# Patient Record
Sex: MALE | Race: WHITE | NOT HISPANIC OR LATINO | Employment: PART TIME | URBAN - NONMETROPOLITAN AREA
[De-identification: names, ages, dates, MRNs, and addresses within clinical notes are randomized per-mention and may not be internally consistent; named-entity substitution may affect disease eponyms.]

---

## 2017-02-06 ENCOUNTER — HISTORY (OUTPATIENT)
Dept: FAMILY MEDICINE | Facility: OTHER | Age: 42
End: 2017-02-06

## 2017-02-06 ENCOUNTER — OFFICE VISIT - GICH (OUTPATIENT)
Dept: FAMILY MEDICINE | Facility: OTHER | Age: 42
End: 2017-02-06

## 2017-02-06 DIAGNOSIS — R39.15 URGENCY OF URINATION: ICD-10-CM

## 2017-02-06 LAB
BACTERIA URINE: ABNORMAL BACTERIA/HPF
BILIRUB UR QL: NEGATIVE
CLARITY, URINE: CLEAR CLARITY
COLOR UR: YELLOW COLOR
EPITHELIAL CELLS: ABNORMAL EPI/HPF
GLUCOSE URINE: NEGATIVE MG/DL
KETONES UR QL: NEGATIVE MG/DL
LEUKOCYTE ESTERASE URINE: ABNORMAL
NITRITE UR QL STRIP: NEGATIVE
OCCULT BLOOD,URINE - HISTORICAL: NEGATIVE
PH UR: 6 [PH]
PROTEIN QUALITATIVE,URINE - HISTORICAL: NEGATIVE MG/DL
RBC - HISTORICAL: ABNORMAL /HPF
SP GR UR STRIP: 1.02
UROBILINOGEN,QUALITATIVE - HISTORICAL: NORMAL EU/DL
WBC - HISTORICAL: ABNORMAL /HPF

## 2017-02-08 ENCOUNTER — COMMUNICATION - GICH (OUTPATIENT)
Dept: FAMILY MEDICINE | Facility: OTHER | Age: 42
End: 2017-02-08

## 2017-02-08 DIAGNOSIS — F42.9 OBSESSIVE-COMPULSIVE DISORDER: ICD-10-CM

## 2017-02-24 ENCOUNTER — COMMUNICATION - GICH (OUTPATIENT)
Dept: FAMILY MEDICINE | Facility: OTHER | Age: 42
End: 2017-02-24

## 2017-02-24 DIAGNOSIS — N41.0 ACUTE PROSTATITIS: ICD-10-CM

## 2017-12-11 ENCOUNTER — OFFICE VISIT - GICH (OUTPATIENT)
Dept: FAMILY MEDICINE | Facility: OTHER | Age: 42
End: 2017-12-11

## 2017-12-11 ENCOUNTER — HISTORY (OUTPATIENT)
Dept: FAMILY MEDICINE | Facility: OTHER | Age: 42
End: 2017-12-11

## 2017-12-11 DIAGNOSIS — R30.0 DYSURIA: ICD-10-CM

## 2017-12-11 DIAGNOSIS — F42.9 OBSESSIVE-COMPULSIVE DISORDER: ICD-10-CM

## 2017-12-11 LAB
BILIRUB UR QL: NEGATIVE
CLARITY, URINE: CLEAR CLARITY
COLOR UR: YELLOW COLOR
GLUCOSE URINE: NEGATIVE MG/DL
KETONES UR QL: NEGATIVE MG/DL
LEUKOCYTE ESTERASE URINE: NEGATIVE
NITRITE UR QL STRIP: NEGATIVE
OCCULT BLOOD,URINE - HISTORICAL: NEGATIVE
PH UR: 5 [PH]
PROTEIN QUALITATIVE,URINE - HISTORICAL: NEGATIVE MG/DL
SP GR UR STRIP: 1.02
UROBILINOGEN,QUALITATIVE - HISTORICAL: NORMAL EU/DL

## 2018-01-02 ENCOUNTER — HISTORY (OUTPATIENT)
Dept: PEDIATRICS | Facility: OTHER | Age: 43
End: 2018-01-02

## 2018-01-02 ENCOUNTER — OFFICE VISIT - GICH (OUTPATIENT)
Dept: PEDIATRICS | Facility: OTHER | Age: 43
End: 2018-01-02

## 2018-01-02 DIAGNOSIS — M76.62 ACHILLES TENDINITIS OF LEFT LOWER EXTREMITY: ICD-10-CM

## 2018-01-02 DIAGNOSIS — M76.61 ACHILLES TENDINITIS OF RIGHT LOWER EXTREMITY: ICD-10-CM

## 2018-01-03 ENCOUNTER — HOSPITAL ENCOUNTER (OUTPATIENT)
Dept: PHYSICAL THERAPY | Facility: OTHER | Age: 43
Setting detail: THERAPIES SERIES
End: 2018-01-03
Attending: INTERNAL MEDICINE

## 2018-01-03 DIAGNOSIS — M76.61 ACHILLES TENDINITIS OF RIGHT LOWER EXTREMITY: ICD-10-CM

## 2018-01-03 DIAGNOSIS — M76.62 ACHILLES TENDINITIS OF LEFT LOWER EXTREMITY: ICD-10-CM

## 2018-01-03 NOTE — TELEPHONE ENCOUNTER
Patient Information     Patient Name MRN Bebeto Vasquez [id number] Male 1975      Telephone Encounter by Shira Rodriguez at 2017  1:45 PM     Author:  Shira Rodriguez Service:  (none) Author Type:  (none)     Filed:  2017  1:47 PM Encounter Date:  2017 Status:  Signed     :  Shira Rodriguez            Patient states he is still having burning with urination. He states he was told by Dc Youssef MD if nothing was to get better that he would prescribe something. Explained that Dc Youssef MD is out until Wednesday, he would like a message sent to another provider to see if something can be done?    Shira Rodriguez LPN.......................... 2017  1:46 PM

## 2018-01-03 NOTE — PROGRESS NOTES
Patient Information     Patient Name MRN Sex Bebeto Rivera [id number] Male 1975      Progress Notes by Dc Youssef MD at 2017  1:45 PM     Author:  Dc Youssef MD Service:  (none) Author Type:  Physician     Filed:  2017  3:05 PM Encounter Date:  2017 Status:  Signed     :  Dc Youssef MD (Physician)            SUBJECTIVE:  Bebeto Prescott is a 41 y.o. male who presents concerned about possible prostate issues. About 7-10 days ago started to note a warm, tingly feeling between his scrotum and rectum. Also during this time had slight urinary urgency and frequency without dysuria. Had intercourse once with his wife during this time with slight burning with ejaculation. Family history is positive for kidney cancer. He denies fever or urethral discharge.    Allergies      Allergen   Reactions     Other [Unlisted Allergen (Include Detail In Comments)]  Other - Describe In Comment Field     Moldy leaves: sneezing, runny nose, cough     and   Current Outpatient Prescriptions on File Prior to Visit       Medication  Sig Dispense Refill     PARoxetine (PAXIL) 10 mg tablet Take 1 tablet by mouth every morning. 90 tablet 4     No current facility-administered medications on file prior to visit.        OBJECTIVE:  /88  Pulse 60  Wt 90.4 kg (199 lb 4 oz)  BMI 31.21 kg/m2  EXAM:  General Appearance: Pleasant, alert, appropriate appearance for age. No acute distress  Rectal Exam: Prostate is mildly enlarged and boggy without significant tenderness.  Genitourinary Exam Male: Testicular exam reveals no tenderness or masses. No evidence of inguinal hernias.    Results for orders placed or performed in visit on 17      URINALYSIS W REFLEX MICROSCOPIC IF POSITIVE      Result  Value Ref Range    COLOR                     Yellow Yellow Color    CLARITY                   Clear Clear Clarity    SPECIFIC GRAVITY,URINE    1.025 1.010, 1.015, 1.020, 1.025                     PH,URINE                  6.0 6.0, 7.0, 8.0, 5.5, 6.5, 7.5, 8.5                    UROBILINOGEN,QUALITATIVE  Normal Normal EU/dl    PROTEIN, URINE Negative Negative mg/dL    GLUCOSE, URINE Negative Negative mg/dL    KETONES,URINE             Negative Negative mg/dL    BILIRUBIN,URINE           Negative Negative                    OCCULT BLOOD,URINE        Negative Negative                    NITRITE                   Negative Negative                    LEUKOCYTE ESTERASE        Trace (A) Negative                   URINALYSIS MICROSCOPIC      Result  Value Ref Range    RBC None Seen 0-2, None Seen /HPF    WBC 6-10 (A) 0-2, 3-5, None Seen /HPF    BACTERIA                  None Seen None Seen, Rare, Occasional, Few Bacteria/HPF    EPITHELIAL CELLS          Few None Seen, Few Epi/HPF       ASSESSMENT/Plan :      ICD-10-CM    1. Urinary urgency  Elected to proceed with a urine culture. Symptoms consistent with a mild urethritis versus mild prostatitis. His symptoms have been improving. Recommended increased fluids and observation. Would consider treating if not improving over the next week.  R39.15 URINALYSIS W REFLEX MICROSCOPIC IF POSITIVE       Dc Youssef MD

## 2018-01-03 NOTE — TELEPHONE ENCOUNTER
Patient Information     Patient Name MRN Sex Bebeto Rivera [id number] Male 1975      Telephone Encounter by Bryan Bernal MD at 2017  2:04 PM     Author:  Bryan Bernal MD  Service:  (none) Author Type:  Physician     Filed:  2017  2:11 PM  Encounter Date:  2017 Status:  Addendum     :  Bryan Bernal MD (Physician)        Related Notes: Original Note by Bryan Bernal MD (Physician) filed at 2017  2:06 PM            Urine culture was neg.  I do not know what Dr Youssef was thinking   Possible a antibiotics  Will prescribe 1 week of Bactrim but patient need to see PCP next week

## 2018-01-03 NOTE — TELEPHONE ENCOUNTER
Patient Information     Patient Name MRN Bebeto Vasquez [id number] Male 1975      Telephone Encounter by Shira Rodriguez at 2017  2:28 PM     Author:  Shira Rodriguez Service:  (none) Author Type:  (none)     Filed:  2017  2:28 PM Encounter Date:  2017 Status:  Signed     :  Shira Rodriguez            Patient notified.  Shira Rodriguez LPN.......................... 2017  2:28 PM

## 2018-01-03 NOTE — TELEPHONE ENCOUNTER
Patient Information     Patient Name MRN Bebeto Vasquez [id number] Male 1975      Telephone Encounter by Alyce Ying RN at 2017  9:48 AM     Author:  Alyce Ying RN Service:  (none) Author Type:  (none)     Filed:  2017  9:50 AM Encounter Date:  2017 Status:  Signed     :  Alyce Ying RN (NURS- Registered Nurse)            Depression-in adults 18 and over  SSRI    Office visit in the past 12 months or as indicated in chart.  Should have clinic visit 1-2 months after initial prescription.    Last visit with VALERI DIETRICH was on: 2017 in [facility]  Next visit with VALERI DIETRICH is on: No future appointment listed with this provider  Next visit with Family Practice is on: No future appointment listed in this department    Max refills 12 months from last office visit or per providers notes.    PHQ Depression Screening 2016   Date of PHQ exam (doc flow) 2016   1. Lack of interest/pleasure 0 - Not at all 0 - Not at all   2. Feeling down/depressed 0 - Not at all 0 - Not at all   PHQ-2 TOTAL SCORE 0 0     Prescription refilled per RN Medication Refill Policy.................... Alyce Ying ....................  2017   9:48 AM

## 2018-01-03 NOTE — NURSING NOTE
Patient Information     Patient Name MRN Bebeto Vasquez [id number] Male 1975      Nursing Note by Brook Calderon at 2017  1:45 PM     Author:  Brook Calderon Service:  (none) Author Type:  (none)     Filed:  2017  1:57 PM Encounter Date:  2017 Status:  Signed     :  Brook Calderon            Patient presents today with some prostate concerns.  He states that he had some symptoms that he would rather discuss with the provider.  Brook Calderon LPN  2017  1:51 PM

## 2018-01-05 ENCOUNTER — HOSPITAL ENCOUNTER (OUTPATIENT)
Dept: PHYSICAL THERAPY | Facility: OTHER | Age: 43
Setting detail: THERAPIES SERIES
End: 2018-01-05
Attending: INTERNAL MEDICINE

## 2018-01-08 ENCOUNTER — HOSPITAL ENCOUNTER (OUTPATIENT)
Dept: PHYSICAL THERAPY | Facility: OTHER | Age: 43
Setting detail: THERAPIES SERIES
End: 2018-01-08
Attending: INTERNAL MEDICINE

## 2018-01-12 ENCOUNTER — HOSPITAL ENCOUNTER (OUTPATIENT)
Dept: PHYSICAL THERAPY | Facility: OTHER | Age: 43
Setting detail: THERAPIES SERIES
End: 2018-01-12
Attending: INTERNAL MEDICINE

## 2018-01-17 ENCOUNTER — HOSPITAL ENCOUNTER (OUTPATIENT)
Dept: PHYSICAL THERAPY | Facility: OTHER | Age: 43
Setting detail: THERAPIES SERIES
End: 2018-01-17
Attending: INTERNAL MEDICINE

## 2018-01-24 ENCOUNTER — DOCUMENTATION ONLY (OUTPATIENT)
Dept: FAMILY MEDICINE | Facility: OTHER | Age: 43
End: 2018-01-24

## 2018-01-24 ENCOUNTER — HOSPITAL ENCOUNTER (OUTPATIENT)
Dept: PHYSICAL THERAPY | Facility: OTHER | Age: 43
Setting detail: THERAPIES SERIES
End: 2018-01-24
Attending: INTERNAL MEDICINE

## 2018-01-24 PROBLEM — M76.62 ACHILLES TENDONITIS, BILATERAL: Status: ACTIVE | Noted: 2018-01-02

## 2018-01-24 PROBLEM — T78.40XA ALLERGIC STATE: Status: ACTIVE | Noted: 2018-01-24

## 2018-01-24 PROBLEM — M76.61 ACHILLES TENDONITIS, BILATERAL: Status: ACTIVE | Noted: 2018-01-02

## 2018-01-24 PROBLEM — N41.0 ACUTE PROSTATITIS: Status: ACTIVE | Noted: 2017-02-24

## 2018-01-24 RX ORDER — PAROXETINE 10 MG/1
10 TABLET, FILM COATED ORAL EVERY MORNING
COMMUNITY
Start: 2017-12-11 | End: 2019-01-04

## 2018-01-26 VITALS
SYSTOLIC BLOOD PRESSURE: 124 MMHG | DIASTOLIC BLOOD PRESSURE: 88 MMHG | WEIGHT: 199.25 LBS | HEART RATE: 60 BPM | BODY MASS INDEX: 31.21 KG/M2

## 2018-02-07 ENCOUNTER — HOSPITAL ENCOUNTER (OUTPATIENT)
Dept: PHYSICAL THERAPY | Facility: OTHER | Age: 43
Setting detail: THERAPIES SERIES
End: 2018-02-07
Attending: INTERNAL MEDICINE

## 2018-02-09 VITALS
DIASTOLIC BLOOD PRESSURE: 68 MMHG | WEIGHT: 196 LBS | BODY MASS INDEX: 29.7 KG/M2 | SYSTOLIC BLOOD PRESSURE: 110 MMHG | HEIGHT: 68 IN

## 2018-02-09 VITALS
HEIGHT: 68 IN | HEART RATE: 64 BPM | DIASTOLIC BLOOD PRESSURE: 72 MMHG | SYSTOLIC BLOOD PRESSURE: 118 MMHG | BODY MASS INDEX: 30.77 KG/M2 | WEIGHT: 203 LBS

## 2018-02-12 NOTE — PROGRESS NOTES
"Patient Information     Patient Name MRN Sex Bebeto Rivera [id number] Male 1975      Progress Notes by Dc Youssef MD at 2017  9:45 AM     Author:  Dc Youssef MD Service:  (none) Author Type:  Physician     Filed:  2017 10:26 AM Encounter Date:  2017 Status:  Signed     :  Dc Youssef MD (Physician)            SUBJECTIVE:  Bebeto Prescott is a 42 y.o. male who presents for evaluation of discomfort at the base of his scrotum that worsens slightly with urination. Has noted a slight increase in urinary frequency over time but nothing dramatic recently. Denies blood in the urine. He had similar symptoms before and improved with a course of Bactrim DS. He is  and in a monogamous relationship. Denies urethral discharge. He has been doing a lot of exercising on a stationary bicycle recently and is wondering if this could be contributing to his symptoms. He has stopped bicycling for now and has noted almost complete resolution of symptoms.    Allergies      Allergen   Reactions     Other [Unlisted Allergen (Include Detail In Comments)]  Other - Describe In Comment Field     Moldy leaves: sneezing, runny nose, cough     and   No current outpatient prescriptions on file prior to visit.     No current facility-administered medications on file prior to visit.        OBJECTIVE:  /68  Ht 1.721 m (5' 7.75\")  Wt 88.9 kg (196 lb)  BMI 30.02 kg/m2  EXAM:  General Appearance: Pleasant, alert, appropriate appearance for age. No acute distress  Genitourinary Exam Male: Testicles without masses or tenderness. No evidence of inguinal hernias.    ASSESSMENT/Plan :      ICD-10-CM    1. Dysuria  Likely related to local trauma from frequent bicycling. Will await urinalysis results. Would recommend he adjust the height of the bicycle seat in the future to see if this helps. Consider treating again with Bactrim DS for possible mild prostatitis if symptoms return.  " R30.0 URINALYSIS W REFLEX MICROSCOPIC IF POSITIVE      URINALYSIS W REFLEX MICROSCOPIC IF POSITIVE   2. Obsessive-compulsive disorder, unspecified type F42.9 PARoxetine (PAXIL) 10 mg tablet       Dc Youssef MD

## 2018-02-12 NOTE — PROGRESS NOTES
Patient Information     Patient Name MRN Sex Bebeto Rivera [id number] Male 1975      Progress Notes by Bebeto Feldman MD at 2018  4:15 PM     Author:  Bebeto Feldman MD Service:  (none) Author Type:  Physician     Filed:  2018  5:00 PM Encounter Date:  2018 Status:  Signed     :  Bebeto Feldman MD (Physician)            Subjective  Bebeto Prescott is a 42 y.o. male who presents for Achilles tendon pain. He first injured his left Achilles tendon 8 years ago. It was while he was training for half marathon. He was diagnosed with micro-tears of the Achilles tendon. He took 2 years off from training. The thing that helped in the most was microcurrent therapy. He was improved and even transfer and a half marathon without symptoms. Last winter he had a recurrence of bilateral Achilles heel pain. The right is now fine but the left seems to continue to be worse. Yesterday he went to the [employer] and hurt his foot a lot. He's limping around.    Problem List/PMH: reviewed in EMR, and made relevant updates today.  Medications: reviewed in EMR, and made relevant updates today.  Allergies: reviewed in EMR, and made relevant updates today.    Social Hx:  Social History     Substance Use Topics       Smoking status: Never Smoker     Smokeless tobacco: Never Used     Alcohol use No     Social History Narrative    Single.  Recently moved to [place] to  a new [place].      I reviewed social history and made relevant updates today.    Family Hx:   Family History       Problem   Relation Age of Onset     Cancer  Father      Kidney cancer       Hyperlipidemia  Father      Hyperlipidemia       GI Disease  Mother      Diverticulosis.       Psychiatric illness  Brother      Depression, learning disability       Cancer-breast  Sister      Possible breast cancer         Objective  Vitals: reviewed in EMR.  /72 (Cuff Site: Right Arm, Position: Sitting, Cuff Size: Adult Large)   "Pulse 64  Ht 1.721 m (5' 7.75\")  Wt 92.1 kg (203 lb)  BMI 31.09 kg/m2    Gen: Pleasant male, NAD.  HEENT: MMM  Neck: Supple  Pulm: Breathing easily  Neuro: Grossly intact  Skin: No concerning lesions.  Psychiatric: Normal affect and insight. Does not appear anxious or depressed.  Musculoskeletal: Tenderness to palpation of the Achilles tendon bilaterally left greater than right.      Assessment    ICD-10-CM    1. Achilles tendonitis, bilateral M76.61 AMB CONSULT TO PHYSICAL THERAPY     M76.62      Plan   -- Expected clinical course discussed   -- Medications and their side effects discussed  Patient Instructions    -- Tylenol 1000 mg (2 extra strength tablets) 3 times per day scheduled   -- No other sources of Tylenol/acetaminophen/APAP, as this can affect your liver   -- Ibuprofen 600 mg (3 tablets) 3 times per day for 7 days, then as needed   -- Take ibuprofen with food, as can be hard on the stomach   -- Rest   -- Ice/heat whichever feels better   -- Topicals: capsaicin cream, lidocaine, Aspercreme/IcyHot   -- Schedule visit for physical therapy   -- Call or come back if concerns, else as needed     -- Will consult Dr. Guardado if you are not improving with PT and rest           Index Korean All languages Exercises Related topics   Achilles Tendon Injury   ________________________________________________________________________  KEY POINTS    An Achilles tendon injury is stretching or tearing of the strong band of tissue that attaches your heel bone to the calf muscle.    Change or stop doing the activities that cause pain until the injury heals.    An Achilles tendon injury can be treated with special shoes or shoe inserts, exercise, ice, and sometimes with medicine or surgery.  ________________________________________________________________________  What is an Achilles tendon injury?   An Achilles tendon injury is a problem with the tendon that connects your heel bone to the calf muscle of your lower leg. " Tendons are strong bands of tissue that attach muscle to bone. You use the Achilles tendon when you point your foot down and when you walk, run, or jump.  Tendons can be injured suddenly or they may be slowly damaged over time. You can have tiny or partial tears in your tendon. If you have a complete tear of your tendon, it s called a rupture. Other tendon injuries may be called a strain, tendinosis, or tendonitis.   What is the cause?   Achilles tendon injuries can be caused by:    Overuse of the tendon, such as from lots of uphill running, intense exercise, or sports training or from doing a lot of work that causes you to bend at the knees and ankles    A sudden activity that twists or tears your tendon, such as jumping, starting to sprint, or falling  You are more likely to have an Achilles tendon problem if you:    Have tight calf muscles or a tight Achilles tendon    Change the type of running shoes you wear, or if you wear high heels most of the day and then switch to lower heeled shoes for exercise    Have a problem called over-pronation, which happens when your feet roll inward and flatten out more than normal when you walk or run  What are the symptoms?   Symptoms may include:    Pain, stiffness, weakness, or swelling in the back of your lower leg    Pain in the back of your leg or ankle when you rise up on your toes    Trouble pointing your foot downward  If the tendon is completely torn, you may have felt a pop at the time of the injury. You may not be able to lift your heel off the ground or point your toes.  How is it diagnosed?   Your healthcare provider will ask about your symptoms, activities, and medical history and examine you. Your provider may ask to watch you walk or run to see if your feet flatten more than normal. Tests may include:    X-rays    MRI, which uses a strong magnetic field and radio waves to show detailed pictures of your foot and leg    An ultrasound, which uses sound waves to  show pictures of the lower leg  How is it treated?   You will need to change or stop doing the activities that cause pain until your tendon has healed. For example, you may need to swim instead of run.   Your healthcare provider may recommend stretching and strengthening exercises to help you heal.   Special shoes or shoe inserts may help. If you have a severe injury, your healthcare provider may put your foot in a cast or boot for several weeks to keep it from moving while it heals.   If your tendon is torn, you may need surgery to repair the tendon.  The pain often gets better within a few weeks with self-care, but some injuries may take several months or longer to heal. It s important to follow all of your healthcare provider s instructions.  How can I take care of myself?  To keep swelling down and help relieve pain:    Put an ice pack, gel pack, or package of frozen vegetables wrapped in a cloth on the injured area every 3 to 4 hours for up to 20 minutes at a time.    Do ice massage. To do this, freeze water in a Styrofoam cup, then peel the top of the cup away to expose the ice. Hold the bottom of the cup and rub the ice over the painful area for 5 to 10 minutes. Do this several times a day while you have pain.    Keep your foot up on pillows so that it is above the level of your heart when you sit or lie down.    Take nonprescription pain medicine, such as acetaminophen, ibuprofen, or naproxen. Read the label and take as directed. Unless recommended by your healthcare provider, you should not take these medicines for more than 10 days.    Nonsteroidal anti-inflammatory medicines (NSAIDs), such as ibuprofen, naproxen, and aspirin, may cause stomach bleeding and other problems. These risks increase with age.    Acetaminophen may cause liver damage or other problems. Unless recommended by your provider, don't take more than 3000 milligrams (mg) in 24 hours. To make sure you don t take too much, check other  medicines you take to see if they also contain acetaminophen. Ask your provider if you need to avoid drinking alcohol while taking this medicine.    Put moist heat on the sore area for 10 to 15 minutes before you do warm-up and stretching exercises. Moist heat may help relax your muscles. Moist heat includes heat patches or moist heating pads that you can buy at most drugstores, a warm wet washcloth, or a hot shower. To prevent burns to your skin, follow directions on the package and do not lie on any type of hot pad. Don t use heat if you have swelling.  Follow your healthcare provider's instructions, including any exercises recommended by your provider. Ask your provider:    How and when you will get your test results    How long it will take to recover    If there are activities you should avoid and when you can return to your normal activities    How to take care of yourself at home    What symptoms or problems you should watch for and what to do if you have them  Make sure you know when you should come back for a checkup. Keep all appointments for provider visits or tests.  How can I help prevent an Achilles tendon problem?   Warm-up exercises and stretching before activities can help prevent injuries. If you have tight Achilles tendons or calf muscles, stretch them twice a day whether or not you are doing any activities that day. If your leg or ankle hurts after exercise, putting ice on it may help keep it from getting injured.   Avoid running uphill if you tend to have Achilles tendon injuries.   Follow the safety rules and use the protective equipment recommended for your work or sport.  Developed by [employer].  Adult Advisor 2017.2 published by [employer].  Last modified: 2016-10-24  Last reviewed: 2016-10-24  This content is reviewed periodically and is subject to change as new health information becomes available. The information is intended to inform and educate and is not a replacement for medical  evaluation, advice, diagnosis or treatment by a healthcare professional.  References   Adult Advisor 2017.2 Index    Copyright   2017 [employer], a division of [employer] All rights reserved.           No Follow-up on file.    Signed, Bebeto Feldman MD  Internal Medicine & Pediatrics

## 2018-02-12 NOTE — NURSING NOTE
Patient Information     Patient Name MRN Sex Bebeto Rivera [id number] Male 1975      Nursing Note by Michaela Hernández at 2018  4:15 PM     Author:  Michaela Hernádnez Service:  (none) Author Type:  (none)     Filed:  2018  4:33 PM Encounter Date:  2018 Status:  Signed     :  Michaela Hernández            Patient presents to clinic for left foot achilles tendon pain.  Michaela Hernández LPN ....................  2018   4:13 PM

## 2018-02-12 NOTE — PATIENT INSTRUCTIONS
Patient Information     Patient Name MRBebeto Verdin [id number] Male 1975      Patient Instructions by Bebeto Feldman MD at 2018  4:15 PM     Author:  Bebeto Feldman MD Service:  (none) Author Type:  Physician     Filed:  2018  4:47 PM Encounter Date:  2018 Status:  Signed     :  Bebeto Feldman MD (Physician)             -- Tylenol 1000 mg (2 extra strength tablets) 3 times per day scheduled   -- No other sources of Tylenol/acetaminophen/APAP, as this can affect your liver   -- Ibuprofen 600 mg (3 tablets) 3 times per day for 7 days, then as needed   -- Take ibuprofen with food, as can be hard on the stomach   -- Rest   -- Ice/heat whichever feels better   -- Topicals: capsaicin cream, lidocaine, Aspercreme/IcyHot   -- Schedule visit for physical therapy   -- Call or come back if concerns, else as needed     -- Will consult Dr. Guardado if you are not improving with PT and rest           Index Lao All languages Exercises Related topics   Achilles Tendon Injury   ________________________________________________________________________  KEY POINTS    An Achilles tendon injury is stretching or tearing of the strong band of tissue that attaches your heel bone to the calf muscle.    Change or stop doing the activities that cause pain until the injury heals.    An Achilles tendon injury can be treated with special shoes or shoe inserts, exercise, ice, and sometimes with medicine or surgery.  ________________________________________________________________________  What is an Achilles tendon injury?   An Achilles tendon injury is a problem with the tendon that connects your heel bone to the calf muscle of your lower leg. Tendons are strong bands of tissue that attach muscle to bone. You use the Achilles tendon when you point your foot down and when you walk, run, or jump.  Tendons can be injured suddenly or they may be slowly damaged over time. You can have tiny or partial  tears in your tendon. If you have a complete tear of your tendon, it s called a rupture. Other tendon injuries may be called a strain, tendinosis, or tendonitis.   What is the cause?   Achilles tendon injuries can be caused by:    Overuse of the tendon, such as from lots of uphill running, intense exercise, or sports training or from doing a lot of work that causes you to bend at the knees and ankles    A sudden activity that twists or tears your tendon, such as jumping, starting to sprint, or falling  You are more likely to have an Achilles tendon problem if you:    Have tight calf muscles or a tight Achilles tendon    Change the type of running shoes you wear, or if you wear high heels most of the day and then switch to lower heeled shoes for exercise    Have a problem called over-pronation, which happens when your feet roll inward and flatten out more than normal when you walk or run  What are the symptoms?   Symptoms may include:    Pain, stiffness, weakness, or swelling in the back of your lower leg    Pain in the back of your leg or ankle when you rise up on your toes    Trouble pointing your foot downward  If the tendon is completely torn, you may have felt a pop at the time of the injury. You may not be able to lift your heel off the ground or point your toes.  How is it diagnosed?   Your healthcare provider will ask about your symptoms, activities, and medical history and examine you. Your provider may ask to watch you walk or run to see if your feet flatten more than normal. Tests may include:    X-rays    MRI, which uses a strong magnetic field and radio waves to show detailed pictures of your foot and leg    An ultrasound, which uses sound waves to show pictures of the lower leg  How is it treated?   You will need to change or stop doing the activities that cause pain until your tendon has healed. For example, you may need to swim instead of run.   Your healthcare provider may recommend stretching and  strengthening exercises to help you heal.   Special shoes or shoe inserts may help. If you have a severe injury, your healthcare provider may put your foot in a cast or boot for several weeks to keep it from moving while it heals.   If your tendon is torn, you may need surgery to repair the tendon.  The pain often gets better within a few weeks with self-care, but some injuries may take several months or longer to heal. It s important to follow all of your healthcare provider s instructions.  How can I take care of myself?  To keep swelling down and help relieve pain:    Put an ice pack, gel pack, or package of frozen vegetables wrapped in a cloth on the injured area every 3 to 4 hours for up to 20 minutes at a time.    Do ice massage. To do this, freeze water in a Styrofoam cup, then peel the top of the cup away to expose the ice. Hold the bottom of the cup and rub the ice over the painful area for 5 to 10 minutes. Do this several times a day while you have pain.    Keep your foot up on pillows so that it is above the level of your heart when you sit or lie down.    Take nonprescription pain medicine, such as acetaminophen, ibuprofen, or naproxen. Read the label and take as directed. Unless recommended by your healthcare provider, you should not take these medicines for more than 10 days.    Nonsteroidal anti-inflammatory medicines (NSAIDs), such as ibuprofen, naproxen, and aspirin, may cause stomach bleeding and other problems. These risks increase with age.    Acetaminophen may cause liver damage or other problems. Unless recommended by your provider, don't take more than 3000 milligrams (mg) in 24 hours. To make sure you don t take too much, check other medicines you take to see if they also contain acetaminophen. Ask your provider if you need to avoid drinking alcohol while taking this medicine.    Put moist heat on the sore area for 10 to 15 minutes before you do warm-up and stretching exercises. Moist heat  may help relax your muscles. Moist heat includes heat patches or moist heating pads that you can buy at most drugstores, a warm wet washcloth, or a hot shower. To prevent burns to your skin, follow directions on the package and do not lie on any type of hot pad. Don t use heat if you have swelling.  Follow your healthcare provider's instructions, including any exercises recommended by your provider. Ask your provider:    How and when you will get your test results    How long it will take to recover    If there are activities you should avoid and when you can return to your normal activities    How to take care of yourself at home    What symptoms or problems you should watch for and what to do if you have them  Make sure you know when you should come back for a checkup. Keep all appointments for provider visits or tests.  How can I help prevent an Achilles tendon problem?   Warm-up exercises and stretching before activities can help prevent injuries. If you have tight Achilles tendons or calf muscles, stretch them twice a day whether or not you are doing any activities that day. If your leg or ankle hurts after exercise, putting ice on it may help keep it from getting injured.   Avoid running uphill if you tend to have Achilles tendon injuries.   Follow the safety rules and use the protective equipment recommended for your work or sport.  Developed by [employer].  Adult Advisor 2017.2 published by [employer].  Last modified: 2016-10-24  Last reviewed: 2016-10-24  This content is reviewed periodically and is subject to change as new health information becomes available. The information is intended to inform and educate and is not a replacement for medical evaluation, advice, diagnosis or treatment by a healthcare professional.  References   Adult Advisor 2017.2 Index    Copyright   2017 [employer], a division of [employer] All rights reserved.

## 2018-02-12 NOTE — NURSING NOTE
Patient Information     Patient Name MRN Bebeto Vasquez [id number] Male 1975      Nursing Note by Brook Calderon at 2017  9:45 AM     Author:  Brook Calderon Service:  (none) Author Type:  (none)     Filed:  2017  9:32 AM Encounter Date:  2017 Status:  Signed     :  Brook Calderon            Patient presents today with dysuria. He states that he has been biking again and that may be causing some symptoms.  Brook Calderon LPN  2017  9:17 AM

## 2018-02-13 NOTE — PROGRESS NOTES
Patient Information     Patient Name MRN Sex Bebeto Rivera [id number] Male 1975      Progress Notes by Yi Duran at 2018  3:14 PM     Author:  Yi Duran Service:  (none) Author Type:  PT- Physical Therapy Assistant     Filed:  2018  4:33 PM Date of Service:  2018  3:14 PM Status:  Signed     :  Yi Duran (PT- Physical Therapy Assistant)            [facility]  Outpatient PT - Daily Note      Date of Service: 2018   Visit 3 of 10        Patient Name: Bebeto Prescott   YOB: 1975   Referring MD/Provider: Gaston  Diagnosis:  Achilles tendonitis, bilateral [M76.61, M76.62]   Treatment Diagnosis: acute on chronic achilles strain, tendonitis.  Insurance:  Other: [employer]  Start of Care Date: 1/3/2018   Certification Dates: From: 1/3/2018   Re-Cert Due: 18      Subjective        Pain Ratin = Moderate Pain, (Aggravating, Grueling, Upsetting, Frustrating) / Location:  Achilles tendon     Bebeto reports that the phono was really helpful. The pain was pretty much non-existent after last appointment for the rest of the day.      Objective    Today's Intervention:      Phonophoresis with 10% ketoprofen gel mix 3.3 mHz 50% pulsed 1.2 w/cm2 10 minutes to L Achilles     IASTM with GT 3-4 and STM at gastroc/soleus/achilles x15 minutes     Long sitting gastroc and soleus stretch 3 x30 second hold each   Long sitting eccentric PF with blue tband x10        Home Exercise Program:   Gentle stretch of gastroc and soleus.  Ice massage  2018 - PF - issued blue tband, declined handout   Assessment     Therapist Assessment: Signs and symptoms consistent with chronic achilles tendonitis, and acute strain.       Pt will benefit from skilled physical therapy to address functional limitations.     Goals:  Patient goal:  Return to running with no limits in 8 weeks.     Functional Short Term Goals (4 weeks):      Patient will walk with a  normal gait pattern.  Patient will have 50% less pain with palpation of achilles.  Patient will have no issues with heel raises for strength.  Patient is to have improved tolerance to exercise using good foot wear, to 75% less strain with bike and lifting.     Functional Long Term Goals (8 weeks):      Patient will be independent in their Home Exercise Program without exacerbation of symptoms.  Patient will have improved running tolerance with good footwear to allow 10 minutes of jogging.  Patient will tolerate all work tasks.  Patient will complete stairs with no limits.  Patient will report less than 2/10 pain during week of work and house chores.        Patient participated in goal selection and understand(s) the plan of care: Yes   Patient Potential for Achieving Desired Outcome:  Good, chronic but works hard.     Response to Intervention: Patient had good response and is in understanding of plan of care and home exercise program at this time.        Plan     Treatment Plan / Targeted Outcomes:     Frequency:   16 visits     Duration of Treatment: 3 months     Planned Interventions:    Home Exercise Program development  Therapeutic Exercise (ROM & Strengthening)  Therapeutic Activities  Manual Therapy  Neuromuscular Re-education  Gait Training  Ultrasound  E-Stim/TENS  Phonophoresis with Ketoprofen  Iontophoresis with Dexamethasone  Warm/Cold Pack  Vasopneumatic Compression with Cold Therapy ( Game Ready )     Plan for next visit: IASTM, phono?, Ionto.     Student or PTA has been instructed in and demonstrates skills necessary to carry out above stated treatment plan: Yes     Thank you for your referral to [facility].  Please call with any questions, concerns or comments.  [phone]     The signature, of the referring medical provider, on this document indicates certification of the above prescribed plan of care and is medically  necessary.

## 2018-02-13 NOTE — PROGRESS NOTES
Patient Information     Patient Name MRN Sex Bebeto Rivera [id number] Male 1975      Progress Notes by Segundo Bailon, PT at 2018 10:35 AM     Author:  Segundo Bailon PT Service:  (none) Author Type:  PT- Physical Therapist     Filed:  2018 12:18 PM Date of Service:  2018 10:35 AM Status:  Signed     :  Segundo Bailon PT (PT- Physical Therapist)                [facility]  Outpatient PT - Daily Note      Date of Service: 2018   Visit 4 of 10        Patient Name: Bebeto Prescott   YOB: 1975   Referring MD/Provider: Gaston  Diagnosis:  Achilles tendonitis, bilateral [M76.61, M76.62]   Treatment Diagnosis: acute on chronic achilles strain, tendonitis.  Insurance:  Other: [employer]  Start of Care Date: 1/3/2018   Certification Dates: From: 1/3/2018   Re-Cert Due: 18      Subjective        Pain Ratin = Moderate Pain, (Aggravating, Grueling, Upsetting, Frustrating) / Location:  Achilles tendon     Continues to feel better within achilles, sore after first steps from bed or sitting.      Objective    Today's Intervention:    dsicussion about massage and stretch prior to getting out of bed or a prolonged sitting positions.  Phonophoresis with 10% ketoprofen gel mix 3.3 mHz 50% pulsed 1.2 w/cm2 8 minutes to L Achilles   STM/MFR at gastroc/soleus, with changed ankle and knee positions.  Standing eccentric heel raise/lower.  Ionto 2mL Dex action patch.       Home Exercise Program:   Gentle stretch of gastroc and soleus.  Ice massage  2018 - PF - issued blue tband, declined handout   Assessment     Therapist Assessment: improved achilles pain, less tone in muscle and less swelling in achilles.  Did well with new strength work.     Goals:  Patient goal:  Return to running with no limits in 8 weeks.     Functional Short Term Goals (4 weeks):      Patient will walk with a normal gait pattern.  Patient will have 50% less pain with  palpation of achilles.  Patient will have no issues with heel raises for strength.  Patient is to have improved tolerance to exercise using good foot wear, to 75% less strain with bike and lifting.     Functional Long Term Goals (8 weeks):      Patient will be independent in their Home Exercise Program without exacerbation of symptoms.  Patient will have improved running tolerance with good footwear to allow 10 minutes of jogging.  Patient will tolerate all work tasks.  Patient will complete stairs with no limits.  Patient will report less than 2/10 pain during week of work and house chores.        Patient participated in goal selection and understand(s) the plan of care: Yes   Patient Potential for Achieving Desired Outcome:  Good, chronic but works hard.     Response to Intervention: Patient had good response and is in understanding of plan of care and home exercise program at this time.        Plan     Treatment Plan / Targeted Outcomes:     Frequency:   16 visits     Duration of Treatment: 3 months     Planned Interventions:    Home Exercise Program development  Therapeutic Exercise (ROM & Strengthening)  Therapeutic Activities  Manual Therapy  Neuromuscular Re-education  Gait Training  Ultrasound  E-Stim/TENS  Phonophoresis with Ketoprofen  Iontophoresis with Dexamethasone  Warm/Cold Pack  Vasopneumatic Compression with Cold Therapy ( Game Ready )     Plan for next visit: IASTM, phono?, Ionto.     Student or PTA has been instructed in and demonstrates skills necessary to carry out above stated treatment plan: Yes     Thank you for your referral to [facility].  Please call with any questions, concerns or comments.  [phone]     The signature, of the referring medical provider, on this document indicates certification of the above prescribed plan of care and is medically necessary.

## 2018-02-13 NOTE — PROGRESS NOTES
Patient Information     Patient Name MRN Sex Bebeto Rivera [id number] Male 1975      Progress Notes by Segundo Bailon, PT at 2018  8:26 AM     Author:  Segundo Bailon PT Service:  (none) Author Type:  PT- Physical Therapist     Filed:  2018  8:36 AM Date of Service:  2018  8:26 AM Status:  Signed     :  Segundo Bailon PT (PT- Physical Therapist)              [facility]  Outpatient PT - Daily Note      Date of Service: 2018   Visit 6 of 10        Patient Name: Bebeto Prescott   YOB: 1975   Referring MD/Provider: Gaston  Diagnosis:  Achilles tendonitis, bilateral [M76.61, M76.62]   Treatment Diagnosis: acute on chronic achilles strain, tendonitis.  Insurance:  Other: [employer]  Start of Care Date: 1/3/2018   Certification Dates: From: 1/3/2018   Re-Cert Due: 18      Subjective        Pain Ratin = Moderate Pain, (Aggravating, Grueling, Upsetting, Frustrating) / Location:  Achilles tendon     Overall improving, trying to do more heel raises.  Has occasional increased strain.    Objective    Today's Intervention:    Bike 3 min. Warm up, discussion of s/s  Demo/practice of MFR/STM with ball or foam roll of gastroc  Heel raise with eccentric lower of edge of step,   With varied foot positions, neutral, IR/ER of lower leg/feet.  Prone   STM at gastroc/soleus.  Ionto patch at achilles swollen area,    See past Ionto applications.     Home Exercise Program:   Gentle stretch of gastroc and soleus.  Ice massage  2018 - PF - issued blue tband, declined handout   Heel raise, eccentric lower, varied positions.  Assessment     Therapist Assessment:   Still in sensitive healing phase of achilles.     Goals:  Patient goal:  Return to running with no limits in 8 weeks.     Functional Short Term Goals (4 weeks):      Patient will walk with a normal gait pattern.  Patient will have 50% less pain with palpation of achilles.  Patient will  have no issues with heel raises for strength.  Patient is to have improved tolerance to exercise using good foot wear, to 75% less strain with bike and lifting.     Functional Long Term Goals (8 weeks):      Patient will be independent in their Home Exercise Program without exacerbation of symptoms.  Patient will have improved running tolerance with good footwear to allow 10 minutes of jogging.  Patient will tolerate all work tasks.  Patient will complete stairs with no limits.  Patient will report less than 2/10 pain during week of work and house chores.        Patient participated in goal selection and understand(s) the plan of care: Yes   Patient Potential for Achieving Desired Outcome:  Good, chronic but works hard.     Response to Intervention: Patient had good response and is in understanding of plan of care and home exercise program at this time.        Plan     Treatment Plan / Targeted Outcomes:     Frequency:   16 visits     Duration of Treatment: 3 months     Planned Interventions:    Home Exercise Program development  Therapeutic Exercise (ROM & Strengthening)  Therapeutic Activities  Manual Therapy  Neuromuscular Re-education  Gait Training  Ultrasound  E-Stim/TENS  Phonophoresis with Ketoprofen  Iontophoresis with Dexamethasone  Warm/Cold Pack  Vasopneumatic Compression with Cold Therapy ( Game Ready )     Plan for next visit: discuss D/C.     Student or PTA has been instructed in and demonstrates skills necessary to carry out above stated treatment plan: Yes     Thank you for your referral to [facility].  Please call with any questions, concerns or comments.  [phone]     The signature, of the referring medical provider, on this document indicates certification of the above prescribed plan of care and is medically necessary.

## 2018-02-13 NOTE — PROGRESS NOTES
Patient Information     Patient Name MRN Sex Bebeto Rivera [id number] Male 1975      Progress Notes by Segundo Bailon, PT at 2018  5:09 PM     Author:  Segundo Bailon PT Service:  (none) Author Type:  PT- Physical Therapist     Filed:  2018  5:21 PM Date of Service:  2018  5:09 PM Status:  Signed     :  Segundo Bailon PT (PT- Physical Therapist)              [facility]  Outpatient PT - Daily Note      Date of Service: 2018   Visit 5 of 10        Patient Name: Bebeto Prescott   YOB: 1975   Referring MD/Provider: Gaston  Diagnosis:  Achilles tendonitis, bilateral [M76.61, M76.62]   Treatment Diagnosis: acute on chronic achilles strain, tendonitis.  Insurance:  Other: [employer]  Start of Care Date: 1/3/2018   Certification Dates: From: 1/3/2018   Re-Cert Due: 18      Subjective        Pain Ratin = Moderate Pain, (Aggravating, Grueling, Upsetting, Frustrating) / Location:  Achilles tendon     Continues to improve.  Sore after sitting in a meeting and then getting up.  Felt good when shoveling and snow blowing for 3 hours.    Objective    Today's Intervention:    dsicussion about massage and stretch prior to getting out of bed or a prolonged sitting positions.  Phonophoresis with 10% ketoprofen gel mix 3.3 mHz 50% pulsed 1.2 w/cm2 8 minutes to L Achilles   STM/MFR at gastroc/soleus, with changed ankle and knee positions.  MFR at achilles and deeper than.  Standing eccentric heel raise/lower review, suggested to shift more wt to involved foot as able without exacerbating achilles.       Home Exercise Program:   Gentle stretch of gastroc and soleus.  Ice massage  2018 - PF - issued blue tband, declined handout   Assessment     Therapist Assessment:   Is doing well and understands the protection of soft tissue.     Goals:  Patient goal:  Return to running with no limits in 8 weeks.     Functional Short Term Goals (4 weeks):       Patient will walk with a normal gait pattern.  Patient will have 50% less pain with palpation of achilles.  Patient will have no issues with heel raises for strength.  Patient is to have improved tolerance to exercise using good foot wear, to 75% less strain with bike and lifting.     Functional Long Term Goals (8 weeks):      Patient will be independent in their Home Exercise Program without exacerbation of symptoms.  Patient will have improved running tolerance with good footwear to allow 10 minutes of jogging.  Patient will tolerate all work tasks.  Patient will complete stairs with no limits.  Patient will report less than 2/10 pain during week of work and house chores.        Patient participated in goal selection and understand(s) the plan of care: Yes   Patient Potential for Achieving Desired Outcome:  Good, chronic but works hard.     Response to Intervention: Patient had good response and is in understanding of plan of care and home exercise program at this time.        Plan     Treatment Plan / Targeted Outcomes:     Frequency:   16 visits     Duration of Treatment: 3 months     Planned Interventions:    Home Exercise Program development  Therapeutic Exercise (ROM & Strengthening)  Therapeutic Activities  Manual Therapy  Neuromuscular Re-education  Gait Training  Ultrasound  E-Stim/TENS  Phonophoresis with Ketoprofen  Iontophoresis with Dexamethasone  Warm/Cold Pack  Vasopneumatic Compression with Cold Therapy ( Game Ready )     Plan for next visit: advanec eccentric.     Student or PTA has been instructed in and demonstrates skills necessary to carry out above stated treatment plan: Yes     Thank you for your referral to [facility].  Please call with any questions, concerns or comments.  [phone]     The signature, of the referring medical provider, on this document indicates certification of the above prescribed plan of care and is medically  necessary.

## 2018-02-13 NOTE — PROGRESS NOTES
Patient Information     Patient Name MRN Sex Bebeto Rivera [id number] Male 1975      Progress Notes by Yi Duran at 2018  9:52 AM     Author:  Yi Duran Service:  (none) Author Type:  PT- Physical Therapy Assistant     Filed:  2018 12:12 PM Date of Service:  2018  9:52 AM Status:  Signed     :  Yi Duran (PT- Physical Therapy Assistant)            [facility]  Outpatient PT - Daily Note      Date of Service: 2018   Visit 2 of 10        Patient Name: Bebeto Prescott   YOB: 1975   Referring MD/Provider: Gaston  Diagnosis:  Achilles tendonitis, bilateral [M76.61, M76.62]   Treatment Diagnosis: acute on chronic achilles strain, tendonitis.  Insurance:  Other: [employer]  Start of Care Date: 1/3/2018   Certification Dates: From: 1/3/2018   Re-Cert Due: 18      Subjective        Pain Ratin = Severe Pain, (Miserable, Gnawing, Fierce, Piercing) / Location:  Achilles tendon     Did not fill prednisone prescription, but did have a massage with a homeopathic-type doctor who said his calf muscles were really tight.     Objective    Today's Intervention:      Phonophoresis with 10% ketoprofen gel mix 3.3 mHz 50% pulsed 1.2 w/cm2 10 minutes to L Achilles     IASTM with GT 3-4 and STM at gastroc/soleus/achilles x15 minutes     Ionto at achilles 2.0mL Dex action 2.5 hour patch. - deferred as his skin was irritated from the last patch he use     Plan: review stretches, eccentric PF with tband     Home Exercise Program:   Gentle stretch of gastroc and soleus.  Ice massage     Assessment     Therapist Assessment: Signs and symptoms consistent with chronic achilles tendonitis, and acute strain.       Pt will benefit from skilled physical therapy to address functional limitations.     Goals:  Patient goal:  Return to running with no limits in 8 weeks.     Functional Short Term Goals (4 weeks):      Patient will walk with a normal gait  pattern.  Patient will have 50% less pain with palpation of achilles.  Patient will have no issues with heel raises for strength.  Patient is to have improved tolerance to exercise using good foot wear, to 75% less strain with bike and lifting.     Functional Long Term Goals (8 weeks):      Patient will be independent in their Home Exercise Program without exacerbation of symptoms.  Patient will have improved running tolerance with good footwear to allow 10 minutes of jogging.  Patient will tolerate all work tasks.  Patient will complete stairs with no limits.  Patient will report less than 2/10 pain during week of work and house chores.        Patient participated in goal selection and understand(s) the plan of care: Yes   Patient Potential for Achieving Desired Outcome:  Good, chronic but works hard.     Response to Intervention: Patient had good response and is in understanding of plan of care and home exercise program at this time.        Plan     Treatment Plan / Targeted Outcomes:     Frequency:   16 visits     Duration of Treatment: 3 months     Planned Interventions:    Home Exercise Program development  Therapeutic Exercise (ROM & Strengthening)  Therapeutic Activities  Manual Therapy  Neuromuscular Re-education  Gait Training  Ultrasound  E-Stim/TENS  Phonophoresis with Ketoprofen  Iontophoresis with Dexamethasone  Warm/Cold Pack  Vasopneumatic Compression with Cold Therapy ( Game Ready )     Plan for next visit: IASTM, phono?, Ionto.     Student or PTA has been instructed in and demonstrates skills necessary to carry out above stated treatment plan: Yes     Thank you for your referral to [facility].  Please call with any questions, concerns or comments.  [phone]     The signature, of the referring medical provider, on this document indicates certification of the above prescribed plan of care and is medically  necessary.

## 2018-02-13 NOTE — INITIAL ASSESSMENTS
Patient Information     Patient Name MRN Sex Bebeto Rivera [id number] Male 1975      Initial Assessments by Segundo Bailon PT at 1/3/2018  1:37 PM     Author:  Segundo Bailon PT Service:  (none) Author Type:  PT- Physical Therapist     Filed:  2018  8:28 AM Date of Service:  1/3/2018  1:37 PM Status:  Signed     :  Segundo Bailon PT (PT- Physical Therapist)            [facility]  Outpatient PT - Initial Evaluation      Date of Service: 1/3/2018   Visit 1 of 10      Patient Name: Bebeto Prescott   YOB: 1975   Referring MD/Provider: Gaston  Diagnosis:  Achilles tendonitis, bilateral [M76.61, M76.62]   Treatment Diagnosis: acute on chronic achilles strain, tendonitis.  Insurance:  Other: [employer]  Start of Care Date: 1/3/2018   Certification Dates: From: 1/3/2018   Re-Cert Due: 18    Subjective      History/GARFIELD:   History of achilles pain since   Was running and he had increased pain a few years ago, has not run much sinct.  Had some poor footwear, and noticed increased pain in the past, has tried to keep better shoes.  Micro current with Chiro helped in the past.  Massage therapist Perry Wilson tomorrow in [facility], is a massage/electro/accupressure therapist.    Has tried to use better shoes lately  Has been biking instead of running.    [facility] had significant strain of left achilles.    Previous Injury: chronic achilles strains.  Surgery: NA  Prior Level of Function: limited with running, but has been able to do normal work and house duties.    Pain Ratin = Mild Pain, (Bothersome, Annoying, Irritating, Nagging) and  4 = Moderate Pain, (Aggravating, Grueling, Upsetting, Frustrating) / Location:  Left achilles 1-2 inches above insertion.  Pain description: ache, sharp,   Aggravating factors: walking, quick movements.  Relieving Factors: ice.      Living Situations:  Independent in Living Situation     Preadmission Functional  Mobility: Independent  Cognition:  Oriented to Person, Place, and Time.   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  Significant Medical History:   Past Medical History:     Diagnosis  Date     Radial collateral ligament sprain-left thumb 10/20/2016     Right carpal tunnel syndrome 11/17/2016        Diagnostics:         No images available.    Current Medications:  Reviewed (see chart)    Drug Allergies:  Reviewed (see chart)  ?   Latex Allergy:  no    Previous Treatment:    Pain Meds / Anti-inflammatory Meds  - OTC  Physical Therapy - in past for lumbar/SI  Chiro- on/off for both back and achilles  Injections - no  Surgery - no     Were cultural / age or other special adaptations needed? No      Patient is a vulnerable adult: No      Patient is aware of diagnosis: Yes      Risks and benefits explained: Yes    Subjective rating of current functional limitations:   Functional Activity No Difficulty Mild Difficulty Mod. Difficulty Severe Difficulty Comments   Sleeping        Walking   x     Standing   x      Stairs   x     Sit/Driving   x      Work        Overhead Reach        Behind Back Reach        Grooming        Lift/Carry 1 gallon object            Other:   Has been doing some gentle icing of the achilles.        Objective    Items left blank indicate that the test was inappropriate or not meaningful at the time of evaluation and therefore not performed.    Fall Risk Screening:  No risk factors identified    Observation: swollen nodule of achilles 1-3 inches above insertion at calcaneus.    Palpation: tender along swollen portion of achilles.    Neuro/sensation: intact    ROM: has full ROM, but is tender with end range DF.    Strength: has good strength of PF. With some soreness but full strength of contraction of both gastroc and soleus.    Special Tests: deferred at this time.     Today's Intervention/Treatment:    Eval  IASTM and STM at gastroc/soleus/achilles  Ionto  at achilles 2.0mL Dex action 2.5 hour patch.    Home Exercise Program:   Gentle stretch of gastroc and soleus.  Ice massage    Assessment    Therapist Assessment: Signs and symptoms consistent with chronic achilles tendonitis, and acute strain.       Pt will benefit from skilled physical therapy to address functional limitations.    Goals:  Patient goal:  Return to running with no limits in 8 weeks.    Functional Short Term Goals (4 weeks):     Patient will walk with a normal gait pattern.  Patient will have 50% less pain with palpation of achilles.  Patient will have no issues with heel raises for strength.  Patient is to have improved tolerance to exercise using good foot wear, to 75% less strain with bike and lifting.    Functional Long Term Goals (8 weeks):     Patient will be independent in their Home Exercise Program without exacerbation of symptoms.  Patient will have improved running tolerance with good footwear to allow 10 minutes of jogging.  Patient will tolerate all work tasks.  Patient will complete stairs with no limits.  Patient will report less than 2/10 pain during week of work and house chores.      Patient participated in goal selection and understand(s) the plan of care: Yes   Patient Potential for Achieving Desired Outcome:  Good, chronic but works hard.    Response to Intervention: Patient had good response and is in understanding of plan of care and home exercise program at this time.      Plan    Treatment Plan / Targeted Outcomes:     Frequency:   16 visits     Duration of Treatment: 3 months    Planned Interventions:    Home Exercise Program development  Therapeutic Exercise (ROM & Strengthening)  Therapeutic Activities  Manual Therapy  Neuromuscular Re-education  Gait Training  Ultrasound  E-Stim/TENS  Phonophoresis with Ketoprofen  Iontophoresis with Dexamethasone  Warm/Cold Pack  Vasopneumatic Compression with Cold Therapy ( Game Ready )    Plan for next visit: IASTM, phono?,  Godfrey.    Student or PTA has been instructed in and demonstrates skills necessary to carry out above stated treatment plan: Yes    Thank you for your referral to [facility].  Please call with any questions, concerns or comments.  [phone]    The signature, of the referring medical provider, on this document indicates certification of the above prescribed plan of care and is medically necessary.

## 2018-03-05 NOTE — PROGRESS NOTES
Patient Information     Patient Name MRN Sex Bebeto Rivera [id number] Male 1975      Progress Notes by Segundo Bailon, PT at 2018  1:41 PM     Author:  Segundo Bailon PT Service:  (none) Author Type:  PT- Physical Therapist     Filed:  2018  1:44 PM Date of Service:  2018  1:41 PM Status:  Signed     :  Segundo Bailon PT (PT- Physical Therapist)              [facility]  Outpatient PT - Daily Note      Date of Service: 2018   Visit 7 of 10        Patient Name: Bebeto Prescott   YOB: 1975   Referring MD/Provider: Gaston  Diagnosis:  Achilles tendonitis, bilateral [M76.61, M76.62]   Treatment Diagnosis: acute on chronic achilles strain, tendonitis.  Insurance:  Other: [employer]  Start of Care Date: 1/3/2018   Certification Dates: From: 1/3/2018   Re-Cert Due: 18      Subjective        Pain Ratin = Moderate Pain, (Aggravating, Grueling, Upsetting, Frustrating) / Location:  Achilles tendon     Easily flared, but maybe over doing things with ankle.  Would like to bike or swim for cardio.    Objective    Today's Intervention:    Practice foam roll for gastroc/soleus.  Stretch on slant board.  MFR at gastroc  Eccentric heel raise/lower 3 positions.  Phonophoresis for achilles with keto. 8 min, pulsed 50% 3.3mH, 1/2 wa/cm2  ionto patch given for home application tomorrow.    Home Exercise Program:   Gentle stretch of gastroc and soleus.  Ice massage  2018 - PF - issued blue tband, declined handout   Heel raise, eccentric lower, varied positions.  Assessment     Therapist Assessment:   Still in sensitive healing phase of achilles.     Goals:  Patient goal:  Return to running with no limits in 8 weeks.     Functional Short Term Goals (4 weeks):      Patient will walk with a normal gait pattern.  Patient will have 50% less pain with palpation of achilles.  Patient will have no issues with heel raises for strength.  Patient is to  have improved tolerance to exercise using good foot wear, to 75% less strain with bike and lifting.     Functional Long Term Goals (8 weeks):      Patient will be independent in their Home Exercise Program without exacerbation of symptoms.  Patient will have improved running tolerance with good footwear to allow 10 minutes of jogging.  Patient will tolerate all work tasks.  Patient will complete stairs with no limits.  Patient will report less than 2/10 pain during week of work and house chores.        Patient participated in goal selection and understand(s) the plan of care: Yes   Patient Potential for Achieving Desired Outcome:  Good, chronic but works hard.     Response to Intervention: Patient had good response and is in understanding of plan of care and home exercise program at this time.        Plan     Treatment Plan / Targeted Outcomes:     Frequency:   16 visits     Duration of Treatment: 3 months     Planned Interventions:    Home Exercise Program development  Therapeutic Exercise (ROM & Strengthening)  Therapeutic Activities  Manual Therapy  Neuromuscular Re-education  Gait Training  Ultrasound  E-Stim/TENS  Phonophoresis with Ketoprofen  Iontophoresis with Dexamethasone  Warm/Cold Pack  Vasopneumatic Compression with Cold Therapy ( Game Ready )     Plan for next visit: discuss D/C.     Student or PTA has been instructed in and demonstrates skills necessary to carry out above stated treatment plan: Yes     Thank you for your referral to [facility].  Please call with any questions, concerns or comments.  [phone]     The signature, of the referring medical provider, on this document indicates certification of the above prescribed plan of care and is medically necessary.

## 2018-09-12 ENCOUNTER — OFFICE VISIT (OUTPATIENT)
Dept: FAMILY MEDICINE | Facility: OTHER | Age: 43
End: 2018-09-12
Attending: FAMILY MEDICINE
Payer: COMMERCIAL

## 2018-09-12 VITALS
HEIGHT: 68 IN | TEMPERATURE: 97.4 F | WEIGHT: 203 LBS | DIASTOLIC BLOOD PRESSURE: 74 MMHG | BODY MASS INDEX: 30.77 KG/M2 | SYSTOLIC BLOOD PRESSURE: 114 MMHG | HEART RATE: 64 BPM

## 2018-09-12 DIAGNOSIS — B35.6 TINEA CRURIS: Primary | ICD-10-CM

## 2018-09-12 DIAGNOSIS — L30.9 ECZEMA, UNSPECIFIED TYPE: ICD-10-CM

## 2018-09-12 PROCEDURE — G0463 HOSPITAL OUTPT CLINIC VISIT: HCPCS

## 2018-09-12 PROCEDURE — 99213 OFFICE O/P EST LOW 20 MIN: CPT | Performed by: FAMILY MEDICINE

## 2018-09-12 RX ORDER — CLOTRIMAZOLE 1 %
CREAM (GRAM) TOPICAL 2 TIMES DAILY
Qty: 30 G | Refills: 1 | Status: SHIPPED | OUTPATIENT
Start: 2018-09-12 | End: 2019-01-14

## 2018-09-12 RX ORDER — TRIAMCINOLONE ACETONIDE 1 MG/G
CREAM TOPICAL
Qty: 45 G | Refills: 1 | Status: SHIPPED | OUTPATIENT
Start: 2018-09-12 | End: 2019-01-14

## 2018-09-12 ASSESSMENT — PAIN SCALES - GENERAL: PAINLEVEL: NO PAIN (0)

## 2018-09-12 NOTE — MR AVS SNAPSHOT
"              After Visit Summary   9/12/2018    Bebeto Prescott    MRN: [id number]           Patient Information     Date Of Birth          1975        Visit Information        Provider Department      9/12/2018 2:45 PM Fer Montemayor MD [facility]        Today's Diagnoses     Tinea cruris    -  1    Eczema, unspecified type          Care Instructions    Wash and completely dry groin area at least daily  Clotrimazole twice daily until rash resolved plus 2 days    Triamcinolone for other rashes  Stop using if worse.           Follow-ups after your visit        Who to contact     If you have questions or need follow up information about today's clinic visit or your schedule please contact [facility] directly at [phone].  Normal or non-critical lab and imaging results will be communicated to you by Vibbyhart, letter or phone within 4 business days after the clinic has received the results. If you do not hear from us within 7 days, please contact the clinic through Vibbyhart or phone. If you have a critical or abnormal lab result, we will notify you by phone as soon as possible.  Submit refill requests through [employer] or call your pharmacy and they will forward the refill request to us. Please allow 3 business days for your refill to be completed.          Additional Information About Your Visit        Vibbyhart Information     [employer]Group lets you send messages to your doctor, view your test results, renew your prescriptions, schedule appointments and more. To sign up, go to [email or web address] . Click on \"Log in\" on the left side of the screen, which will take you to the Welcome page. Then click on \"Sign up Now\" on the right side of the page.     You will be asked to enter the access code listed below, as well as some personal information. Please follow the directions to create your username and password.     Your access code is: [email or web address]  Expires: 12/11/2018  " "3:25 PM     Your access code will  in 90 days. If you need help or a new code, please call your [place] clinic or [phone].        Care EveryWhere ID     This is your Care EveryWhere ID. This could be used by other organizations to access your [place] medical records  [id number]        Your Vitals Were     Pulse Temperature Height BMI (Body Mass Index)          64 97.4  F (36.3  C) (Tympanic) 5' 8\" (1.727 m) 30.87 kg/m2         Blood Pressure from Last 3 Encounters:   18 114/74   18 118/72   17 110/68    Weight from Last 3 Encounters:   18 203 lb (92.1 kg)   18 203 lb (92.1 kg)   17 196 lb (88.9 kg)              Today, you had the following     No orders found for display         Today's Medication Changes          These changes are accurate as of 18  3:25 PM.  If you have any questions, ask your nurse or doctor.               Start taking these medicines.        Dose/Directions    clotrimazole 1 % cream   Commonly known as:  LOTRIMIN   Used for:  Tinea cruris   Started by:  Fer Montemayor MD        Apply topically 2 times daily Until rash is gone plus 2 days   Quantity:  30 g   Refills:  1       triamcinolone 0.1 % cream   Commonly known as:  KENALOG   Used for:  Eczema, unspecified type   Started by:  Fer Montemayor MD        Apply to affected area three times daily as needed   Quantity:  45 g   Refills:  1            Where to get your medicines      These medications were sent to [place] [facility] [address] [facility] - [place], [place] -  [address] [facility] [address]     Phone:  [phone]     clotrimazole 1 % cream    triamcinolone 0.1 % cream                Primary Care Provider Office Phone # Fax #    Bebeto Feldman [phone]       [address] [facility] [address]        Equal Access to Services     ARLEEN VAUGHN AH: Theo Macias, waaxda darrick ferguson, " vance vermajesse coats'aan ah. So [facility] [phone].    ATENCIÓN: Si john bolivar, tiene a jimenez disposición servicios gratuitos de asistencia lingüística. Little al [phone].    We comply with applicable federal civil rights laws and [place] laws. We do not discriminate on the basis of race, color, national origin, age, disability, sex, sexual orientation, or gender identity.            Thank you!     Thank you for choosing [facility]  for your care. Our goal is always to provide you with excellent care. Hearing back from our patients is one way we can continue to improve our services. Please take a few minutes to complete the written survey that you may receive in the mail after your visit with us. Thank you!             Your Updated Medication List - Protect others around you: Learn how to safely use, store and throw away your medicines at www.disposemymeds.org.          This list is accurate as of 9/12/18  3:25 PM.  Always use your most recent med list.                   Brand Name Dispense Instructions for use Diagnosis    clotrimazole 1 % cream    LOTRIMIN    30 g    Apply topically 2 times daily Until rash is gone plus 2 days    Tinea cruris       PARoxetine 10 MG tablet    PAXIL     Take 10 mg by mouth every morning        triamcinolone 0.1 % cream    KENALOG    45 g    Apply to affected area three times daily as needed    Eczema, unspecified type

## 2018-09-12 NOTE — PATIENT INSTRUCTIONS
Wash and completely dry groin area at least daily  Clotrimazole twice daily until rash resolved plus 2 days    Triamcinolone for other rashes  Stop using if worse.

## 2018-09-12 NOTE — PROGRESS NOTES
"Nursing Notes:   Fartun Marcial LPN  9/12/2018  3:06 PM  Signed  Chief Complaint   Patient presents with     Derm Problem     started on inner leg and spreading now       Initial /74  Pulse 64  Temp 97.4  F (36.3  C) (Tympanic)  Ht 5' 8\" (1.727 m)  Wt 203 lb (92.1 kg)  BMI 30.87 kg/m2 Estimated body mass index is 30.87 kg/(m^2) as calculated from the following:    Height as of this encounter: 5' 8\" (1.727 m).    Weight as of this encounter: 203 lb (92.1 kg).  Medication Reconciliation: complete    Fartun Marcial LPN   SUBJECTIVE:  43 year old male presents for rash on inner thigh   Tried to treat with tea tree oil. He is wondering about poison ivy since he also has a couple other spots on the right dorsal hand and left upper arm. The hand is itchy, but groin area is not itchy.      REVIEW OF SYSTEMS:    No fever    Current Outpatient Prescriptions   Medication Sig Dispense Refill     PARoxetine (PAXIL) 10 MG tablet Take 10 mg by mouth every morning       Allergies   Allergen Reactions     Mold Other (See Comments)     Moldy leaves: sneezing, runny nose, cough       OBJECTIVE:  /74  Pulse 64  Temp 97.4  F (36.3  C) (Tympanic)  Ht 5' 8\" (1.727 m)  Wt 203 lb (92.1 kg)  BMI 30.87 kg/m2    EXAM:  Skin: Erythematous rash in the right groin consistent with tinea.  Dorsal right hand has a scaled eczematous appearing rash.  Left upper arm with a few scattered papules, nondescript.    ASSESSMENT/PLAN:    ICD-10-CM    1. Tinea cruris B35.6 clotrimazole (LOTRIMIN) 1 % cream   2. Eczema, unspecified type L30.9 triamcinolone (KENALOG) 0.1 % cream     Discussed diagnosis of jock itch.  Use clotrimazole twice daily until rash resolved, plus a couple days.  He needs to keep the area dry if possible.  Wash and pat dry then use a fan for optimal drying before getting dressed.     The other rashes appear to be more of a eczematous reaction.  He can use triamcinolone 2-3 times daily as needed until " resolved    F/U PRN    Fer Montemayor MD    This document was prepared using a combination of typing and voice generated software.  While every attempt was made for accuracy, spelling and grammatical errors may exist.

## 2018-09-12 NOTE — NURSING NOTE
"Chief Complaint   Patient presents with     Derm Problem     started on inner leg and spreading now       Initial /74  Pulse 64  Temp 97.4  F (36.3  C) (Tympanic)  Ht 5' 8\" (1.727 m)  Wt 203 lb (92.1 kg)  BMI 30.87 kg/m2 Estimated body mass index is 30.87 kg/(m^2) as calculated from the following:    Height as of this encounter: 5' 8\" (1.727 m).    Weight as of this encounter: 203 lb (92.1 kg).  Medication Reconciliation: complete    Fartun Marcial LPN  "

## 2019-01-04 DIAGNOSIS — F42.9 OBSESSIVE-COMPULSIVE DISORDER, UNSPECIFIED TYPE: Primary | ICD-10-CM

## 2019-01-04 NOTE — LETTER
January 7, 2019      Bebeto Prescott  [address] [address]        Dear Mr. Prescott,    Your pharmacy has requested a refill of Paxil.  A 90-day refill of this medication has been sent.     According to our records, you are overdue for annual medication management and labs. Your health is very important to us. Please contact our scheduling line at [phone] to set up this appointment at your earliest convenience, and before your next medication refills are needed.     Thank you for choosing [facility] for your health care needs.     Sincerely,        The Refill Nurse  [facility]              Sincerely,        Bebeto Feldman MD

## 2019-01-07 RX ORDER — PAROXETINE 10 MG/1
TABLET, FILM COATED ORAL
Qty: 90 TABLET | Refills: 0 | Status: SHIPPED | OUTPATIENT
Start: 2019-01-07 | End: 2019-01-14

## 2019-01-07 NOTE — TELEPHONE ENCOUNTER
Prescription approved per [facility] Refill Protocol.  Patient is now due for annual medication management and labs. Reminder letter sent, and john paul refill given.  Smiley Laboy RN on 1/7/2019 at 5:25 PM

## 2019-01-14 ENCOUNTER — OFFICE VISIT (OUTPATIENT)
Dept: PEDIATRICS | Facility: OTHER | Age: 44
End: 2019-01-14
Attending: INTERNAL MEDICINE
Payer: COMMERCIAL

## 2019-01-14 VITALS
HEART RATE: 72 BPM | SYSTOLIC BLOOD PRESSURE: 116 MMHG | HEIGHT: 67 IN | WEIGHT: 193.1 LBS | BODY MASS INDEX: 30.31 KG/M2 | DIASTOLIC BLOOD PRESSURE: 56 MMHG

## 2019-01-14 DIAGNOSIS — Z13.220 LIPID SCREENING: ICD-10-CM

## 2019-01-14 DIAGNOSIS — Z13.29 SCREENING FOR THYROID DISORDER: ICD-10-CM

## 2019-01-14 DIAGNOSIS — F42.9 OBSESSIVE-COMPULSIVE DISORDER, UNSPECIFIED TYPE: Primary | ICD-10-CM

## 2019-01-14 DIAGNOSIS — Z12.5 SCREENING FOR PROSTATE CANCER: ICD-10-CM

## 2019-01-14 DIAGNOSIS — R73.09 ELEVATED GLUCOSE LEVEL: ICD-10-CM

## 2019-01-14 DIAGNOSIS — Z13.0 SCREENING, ANEMIA, DEFICIENCY, IRON: ICD-10-CM

## 2019-01-14 PROCEDURE — 99213 OFFICE O/P EST LOW 20 MIN: CPT | Performed by: INTERNAL MEDICINE

## 2019-01-14 PROCEDURE — G0463 HOSPITAL OUTPT CLINIC VISIT: HCPCS

## 2019-01-14 RX ORDER — PAROXETINE 10 MG/1
10 TABLET, FILM COATED ORAL EVERY MORNING
Qty: 90 TABLET | Refills: 3 | Status: SHIPPED | OUTPATIENT
Start: 2019-01-14 | End: 2020-02-07

## 2019-01-14 ASSESSMENT — MIFFLIN-ST. JEOR: SCORE: 1728.49

## 2019-01-14 ASSESSMENT — PAIN SCALES - GENERAL: PAINLEVEL: NO PAIN (0)

## 2019-01-14 NOTE — NURSING NOTE
Pt here for a refill on his Paxil.  Pt takes it for OCD and he says it is still working fine.   Adry Salazar CMA ([facility])......................1/14/2019  8:52 AM     No LMP for male patient.  Medication Reconciliation: complete    Adry Salazar CMA  1/14/2019 8:53 AM

## 2019-01-14 NOTE — PROGRESS NOTES
"Subjective  Bebeto Prescott is a 43 year old male who presents for medication management.  He has a history of obsessive-compulsive disorder and continues on Paxil 10 mg daily.  He noticed that his OCD was worse quite a number of years ago after his father passed away.  Since then he is been able to wean down on the Paxil dose and feels like 10 mg works really well for him.  He feels like it takes the edge off.  No weight gain issues.  No libido changes.  He has been exercising at the [place] 2 days a week.    Problem List/PMH: reviewed in EMR, and made relevant updates today.  Medications: reviewed in EMR, and made relevant updates today.  Allergies: reviewed in EMR, and made relevant updates today.    Social Hx:  Social History     Tobacco Use     Smoking status: Never Smoker     Smokeless tobacco: Never Used   Substance Use Topics     Alcohol use: No     Alcohol/week: 0.0 oz     Drug use: No     Social History     Social History Narrative    Single.  Recently moved to [place] to  a new [employer].     I reviewed social history and made relevant updates today.    Family Hx:   Family History   Problem Relation Age of Onset     Hyperlipidemia Father         Hyperlipidemia,Hyperlipidemia     Kidney Cancer Father      Other - See Comments Mother         GI Disease,Diverticulosis.     Breast Cancer Sister         Cancer-breast,Possible breast cancer     Other - See Comments Brother         Psychiatric illness,Depression, learning disability     Colon Cancer No family hx of      Prostate Cancer No family hx of        Objective  Vitals: reviewed in EMR.  /56 (BP Location: Right arm, Patient Position: Sitting, Cuff Size: Adult Large)   Pulse 72   Ht 1.708 m (5' 7.25\")   Wt 87.6 kg (193 lb 1.6 oz)   BMI 30.02 kg/m      Gen: Pleasant male, NAD.  HEENT: MMM, no OP erythema.   Neck: Supple, no JVD, no bruits.  CV: RRR no m/r/g.   Pulm: CTAB no w/r/r  GI: Soft, NT, ND. No HSM. No masses. Bowel sounds " present.  Neuro: Grossly intact  Skin: No concerning lesions.  Psychiatric: Normal affect and insight. Does not appear anxious or depressed.    Labs:  Lab Results   Component Value Date    HGB 15.2 02/02/2015    HCT 42.8 02/02/2015     02/02/2015    TRIG 117 10/09/2012    HDL 39 (L) 10/09/2012     (L) 02/02/2015    BUN 9 02/02/2015    CO2 30 02/02/2015         Assessment    ICD-10-CM    1. Obsessive-compulsive disorder, unspecified type F42.9 PARoxetine (PAXIL) 10 MG tablet   2. Elevated glucose level R73.09 Basic metabolic panel     Hemoglobin A1c   3. Screening, anemia, deficiency, iron Z13.0 CBC with platelets   4. Screening for thyroid disorder Z13.29 Thyrotropin GH   5. Screening for prostate cancer Z12.5 PSA Screen GH   6. Lipid screening Z13.220 Lipid Profile     Orders Placed This Encounter   Procedures     Basic metabolic panel     CBC with platelets     Thyrotropin GH     PSA Screen GH     Lipid Profile     Hemoglobin A1c       Since he ate breakfast he is going to come back for fasting lab panel for screening purposes.    Plan   -- Expected clinical course discussed   -- Medications and their side effects discussed  Patient Instructions    -- Schedule fasting lab-only visit      No Follow-up on file.    Signed, Bebeto Feldman MD  Internal Medicine & Pediatrics

## 2019-06-01 ENCOUNTER — APPOINTMENT (OUTPATIENT)
Dept: CT IMAGING | Facility: OTHER | Age: 44
End: 2019-06-01
Attending: FAMILY MEDICINE
Payer: COMMERCIAL

## 2019-06-01 ENCOUNTER — HOSPITAL ENCOUNTER (EMERGENCY)
Facility: OTHER | Age: 44
Discharge: HOME | End: 2019-06-01
Attending: FAMILY MEDICINE | Admitting: FAMILY MEDICINE
Payer: COMMERCIAL

## 2019-06-01 ENCOUNTER — APPOINTMENT (OUTPATIENT)
Dept: GENERAL RADIOLOGY | Facility: OTHER | Age: 44
End: 2019-06-01
Attending: FAMILY MEDICINE
Payer: COMMERCIAL

## 2019-06-01 VITALS
DIASTOLIC BLOOD PRESSURE: 74 MMHG | WEIGHT: 190 LBS | HEART RATE: 51 BPM | BODY MASS INDEX: 29.82 KG/M2 | RESPIRATION RATE: 18 BRPM | HEIGHT: 67 IN | OXYGEN SATURATION: 97 % | SYSTOLIC BLOOD PRESSURE: 117 MMHG | TEMPERATURE: 96.9 F

## 2019-06-01 DIAGNOSIS — F41.9 ANXIETY: ICD-10-CM

## 2019-06-01 DIAGNOSIS — R10.84 ABDOMINAL PAIN, GENERALIZED: ICD-10-CM

## 2019-06-01 LAB
ALBUMIN SERPL-MCNC: 4.5 G/DL (ref 3.5–5.7)
ALBUMIN UR-MCNC: NEGATIVE MG/DL
ALP SERPL-CCNC: 58 U/L (ref 34–104)
ALT SERPL W P-5'-P-CCNC: 26 U/L (ref 7–52)
ANION GAP SERPL CALCULATED.3IONS-SCNC: 5 MMOL/L (ref 3–14)
APPEARANCE UR: CLEAR
AST SERPL W P-5'-P-CCNC: 21 U/L (ref 13–39)
BASOPHILS # BLD AUTO: 0.1 10E9/L (ref 0–0.2)
BASOPHILS NFR BLD AUTO: 1 %
BILIRUB SERPL-MCNC: 0.6 MG/DL (ref 0.3–1)
BILIRUB UR QL STRIP: NEGATIVE
BUN SERPL-MCNC: 14 MG/DL (ref 7–25)
CALCIUM SERPL-MCNC: 9.3 MG/DL (ref 8.6–10.3)
CHLORIDE SERPL-SCNC: 100 MMOL/L (ref 98–107)
CO2 SERPL-SCNC: 32 MMOL/L (ref 21–31)
COLOR UR AUTO: YELLOW
CREAT SERPL-MCNC: 1.07 MG/DL (ref 0.7–1.3)
CRP SERPL-MCNC: 0.1 MG/L
DIFFERENTIAL METHOD BLD: NORMAL
EOSINOPHIL # BLD AUTO: 0.3 10E9/L (ref 0–0.7)
EOSINOPHIL NFR BLD AUTO: 5.3 %
ERYTHROCYTE [DISTWIDTH] IN BLOOD BY AUTOMATED COUNT: 12.1 % (ref 10–15)
GFR SERPL CREATININE-BSD FRML MDRD: 75 ML/MIN/{1.73_M2}
GLUCOSE SERPL-MCNC: 95 MG/DL (ref 70–105)
GLUCOSE UR STRIP-MCNC: NEGATIVE MG/DL
HCT VFR BLD AUTO: 44.5 % (ref 40–53)
HGB BLD-MCNC: 15.9 G/DL (ref 13.3–17.7)
HGB UR QL STRIP: NEGATIVE
IMM GRANULOCYTES # BLD: 0 10E9/L (ref 0–0.4)
IMM GRANULOCYTES NFR BLD: 0.2 %
KETONES UR STRIP-MCNC: NEGATIVE MG/DL
LEUKOCYTE ESTERASE UR QL STRIP: NEGATIVE
LYMPHOCYTES # BLD AUTO: 2.4 10E9/L (ref 0.8–5.3)
LYMPHOCYTES NFR BLD AUTO: 39.3 %
MCH RBC QN AUTO: 31.1 PG (ref 26.5–33)
MCHC RBC AUTO-ENTMCNC: 35.7 G/DL (ref 31.5–36.5)
MCV RBC AUTO: 87 FL (ref 78–100)
MONOCYTES # BLD AUTO: 0.6 10E9/L (ref 0–1.3)
MONOCYTES NFR BLD AUTO: 9.4 %
NEUTROPHILS # BLD AUTO: 2.7 10E9/L (ref 1.6–8.3)
NEUTROPHILS NFR BLD AUTO: 44.8 %
NITRATE UR QL: NEGATIVE
PH UR STRIP: 6 PH (ref 5–9)
PLATELET # BLD AUTO: 211 10E9/L (ref 150–450)
POTASSIUM SERPL-SCNC: 4.3 MMOL/L (ref 3.5–5.1)
PROT SERPL-MCNC: 6.7 G/DL (ref 6.4–8.9)
RBC # BLD AUTO: 5.11 10E12/L (ref 4.4–5.9)
SODIUM SERPL-SCNC: 137 MMOL/L (ref 134–144)
SOURCE: NORMAL
SP GR UR STRIP: 1.02 (ref 1–1.03)
UROBILINOGEN UR STRIP-ACNC: 0.2 EU/DL (ref 0.2–1)
WBC # BLD AUTO: 6.1 10E9/L (ref 4–11)

## 2019-06-01 PROCEDURE — 25000128 H RX IP 250 OP 636: Performed by: FAMILY MEDICINE

## 2019-06-01 PROCEDURE — 85025 COMPLETE CBC W/AUTO DIFF WBC: CPT | Performed by: FAMILY MEDICINE

## 2019-06-01 PROCEDURE — 25000125 ZZHC RX 250: Performed by: FAMILY MEDICINE

## 2019-06-01 PROCEDURE — 86140 C-REACTIVE PROTEIN: CPT | Performed by: FAMILY MEDICINE

## 2019-06-01 PROCEDURE — 36415 COLL VENOUS BLD VENIPUNCTURE: CPT | Performed by: FAMILY MEDICINE

## 2019-06-01 PROCEDURE — 25000132 ZZH RX MED GY IP 250 OP 250 PS 637: Performed by: FAMILY MEDICINE

## 2019-06-01 PROCEDURE — 96375 TX/PRO/DX INJ NEW DRUG ADDON: CPT | Performed by: FAMILY MEDICINE

## 2019-06-01 PROCEDURE — 99284 EMERGENCY DEPT VISIT MOD MDM: CPT | Mod: Z6 | Performed by: FAMILY MEDICINE

## 2019-06-01 PROCEDURE — 25500064 ZZH RX 255 OP 636: Performed by: FAMILY MEDICINE

## 2019-06-01 PROCEDURE — 99285 EMERGENCY DEPT VISIT HI MDM: CPT | Mod: 25 | Performed by: FAMILY MEDICINE

## 2019-06-01 PROCEDURE — 96374 THER/PROPH/DIAG INJ IV PUSH: CPT | Performed by: FAMILY MEDICINE

## 2019-06-01 PROCEDURE — 74019 RADEX ABDOMEN 2 VIEWS: CPT

## 2019-06-01 PROCEDURE — 74177 CT ABD & PELVIS W/CONTRAST: CPT

## 2019-06-01 PROCEDURE — 80053 COMPREHEN METABOLIC PANEL: CPT | Performed by: FAMILY MEDICINE

## 2019-06-01 PROCEDURE — C9113 INJ PANTOPRAZOLE SODIUM, VIA: HCPCS | Performed by: FAMILY MEDICINE

## 2019-06-01 PROCEDURE — 81003 URINALYSIS AUTO W/O SCOPE: CPT | Performed by: FAMILY MEDICINE

## 2019-06-01 RX ORDER — FENTANYL CITRATE 50 UG/ML
50 INJECTION, SOLUTION INTRAMUSCULAR; INTRAVENOUS ONCE
Status: COMPLETED | OUTPATIENT
Start: 2019-06-01 | End: 2019-06-01

## 2019-06-01 RX ORDER — ONDANSETRON 2 MG/ML
4 INJECTION INTRAMUSCULAR; INTRAVENOUS ONCE
Status: COMPLETED | OUTPATIENT
Start: 2019-06-01 | End: 2019-06-01

## 2019-06-01 RX ORDER — LIDOCAINE HYDROCHLORIDE 20 MG/ML
15 SOLUTION OROPHARYNGEAL ONCE
Status: COMPLETED | OUTPATIENT
Start: 2019-06-01 | End: 2019-06-01

## 2019-06-01 RX ORDER — ALUMINA, MAGNESIA, AND SIMETHICONE 2400; 2400; 240 MG/30ML; MG/30ML; MG/30ML
15 SUSPENSION ORAL ONCE
Status: COMPLETED | OUTPATIENT
Start: 2019-06-01 | End: 2019-06-01

## 2019-06-01 RX ADMIN — IOHEXOL 100 ML: 350 INJECTION, SOLUTION INTRAVENOUS at 07:05

## 2019-06-01 RX ADMIN — LIDOCAINE HYDROCHLORIDE 15 ML: 20 SOLUTION ORAL; TOPICAL at 08:18

## 2019-06-01 RX ADMIN — PANTOPRAZOLE SODIUM 40 MG: 40 INJECTION, POWDER, FOR SOLUTION INTRAVENOUS at 08:18

## 2019-06-01 RX ADMIN — FENTANYL CITRATE 50 MCG: 50 INJECTION, SOLUTION INTRAMUSCULAR; INTRAVENOUS at 05:10

## 2019-06-01 RX ADMIN — ALUMINUM HYDROXIDE, MAGNESIUM HYDROXIDE, AND DIMETHICONE 15 ML: 400; 400; 40 SUSPENSION ORAL at 08:17

## 2019-06-01 RX ADMIN — SODIUM CHLORIDE 1000 ML: 9 INJECTION, SOLUTION INTRAVENOUS at 05:11

## 2019-06-01 RX ADMIN — ONDANSETRON HYDROCHLORIDE 4 MG: 2 INJECTION, SOLUTION INTRAMUSCULAR; INTRAVENOUS at 05:52

## 2019-06-01 ASSESSMENT — ENCOUNTER SYMPTOMS
CHILLS: 1
HEMATOLOGIC/LYMPHATIC NEGATIVE: 1
PSYCHIATRIC NEGATIVE: 1
ACTIVITY CHANGE: 1
RESPIRATORY NEGATIVE: 1
NAUSEA: 1
CHEST TIGHTNESS: 0
ABDOMINAL PAIN: 1
CONSTIPATION: 0
DYSURIA: 0
SHORTNESS OF BREATH: 0
APPETITE CHANGE: 1
EYES NEGATIVE: 1
DIARRHEA: 0
NEUROLOGICAL NEGATIVE: 1
MUSCULOSKELETAL NEGATIVE: 1
PALPITATIONS: 0
VOMITING: 0
DIAPHORESIS: 0
CARDIOVASCULAR NEGATIVE: 1
DIFFICULTY URINATING: 0
FREQUENCY: 0
FLANK PAIN: 0
FATIGUE: 0

## 2019-06-01 ASSESSMENT — MIFFLIN-ST. JEOR: SCORE: 1705.46

## 2019-06-01 NOTE — ED TRIAGE NOTES
C/o abdominal pain, middle, last BM yesterday WNL, was seen in the clinic ([facility] 2 days ago) no labs or imagining, seems to be worse tonight. Nausea r/t pain

## 2019-06-01 NOTE — ED AVS SNAPSHOT
[facility]  [address]  [place] [address]  Phone:  [phone]  Fax:  [phone]                                    Bebeto Prescott   MRN: [id number]    Department:  [facility]   Date of Visit:  6/1/2019           After Visit Summary Signature Page    I have received my discharge instructions, and my questions have been answered. I have discussed any challenges I see with this plan with the nurse or doctor.    ..........................................................................................................................................  Patient/Patient Representative Signature      ..........................................................................................................................................  Patient Representative Print Name and Relationship to Patient    ..................................................               ................................................  Date                                   Time    ..........................................................................................................................................  Reviewed by Signature/Title    ...................................................              ..............................................  Date                                               Time          22EPIC Rev 08/18

## 2019-06-01 NOTE — ED PROVIDER NOTES
"  History     Chief Complaint   Patient presents with     Abdominal Pain     HPI  Bebeto Prescott is a 44 year old male who presents to the ER with a 3-day history of abdominal pain.  He stated that it first started on Wednesday morning after he had some creamer and he felt like he had a \"turned stomach\" this is around 9 or 10 in the morning, and he stated he just did not feel he could get comfortable.  He went home and he slept and he felt somewhat better.  Thursday morning he felt okay but then as the day progressed he got worse and he went to the urgent care on Thursday.  He was evaluated, but did not have any films or labs done.  They did not feel further work-up was necessary.  He stated that yesterday morning he felt a little bit better in the morning and then again when he first ate it got worse but then he ate later and felt a little more energy and did some activities outside.  He felt better last night with almost no pain but about 330 this morning he noticed worsening of his pain it became more constant in the middle of his belly.  He denies any constipation.  He has noticed increased stress can do his job as a .  Denies any dysuria or hematuria.  Does have a history of prostatitis per his chart but patient barely remembers that.    Patient does have a history of obsessive-compulsive disorder for which he is presently on Paxil.    Allergies:  Allergies   Allergen Reactions     Mold Other (See Comments)     Moldy leaves: sneezing, runny nose, cough       Problem List:    Patient Active Problem List    Diagnosis Date Noted     Allergic state 01/24/2018     Priority: Medium     Achilles tendonitis, bilateral 01/02/2018     Priority: Medium     Acute prostatitis 02/24/2017     Priority: Medium     Right carpal tunnel syndrome 11/17/2016     Priority: Medium     Radial collateral ligament sprain 10/20/2016     Priority: Medium     Aphthous ulcer 12/20/2011     Priority: Medium     Obsessive-compulsive " disorder 06/22/2011     Priority: Medium     Overview:   History of compulsive behaviors, improved on Paxil.       Contact dermatitis and other eczema due to plants (except food) 06/18/2010     Priority: Medium        Past Medical History:    Past Medical History:   Diagnosis Date     Carpal tunnel syndrome of right wrist      Radial collateral ligament sprain of elbow        Past Surgical History:    Past Surgical History:   Procedure Laterality Date     COLONOSCOPY      2006,Normal,  Follow-up recommended at age 49.     OTHER SURGICAL HISTORY      1999,54211.0,[place] EXCIS SPERMATOCELE,excision of spermatocele and hydrocele, [Other]       Family History:    Family History   Problem Relation Age of Onset     Hyperlipidemia Father         Hyperlipidemia,Hyperlipidemia     Kidney Cancer Father      Other - See Comments Mother         GI Disease,Diverticulosis.     Breast Cancer Sister         Cancer-breast,Possible breast cancer     Other - See Comments Brother         Psychiatric illness,Depression, learning disability     Colon Cancer No family hx of      Prostate Cancer No family hx of        Social History:  Marital Status:   [2]  Social History     Tobacco Use     Smoking status: Never Smoker     Smokeless tobacco: Never Used   Substance Use Topics     Alcohol use: No     Alcohol/week: 0.0 oz     Drug use: No        Medications:      PARoxetine (PAXIL) 10 MG tablet         Review of Systems   Constitutional: Positive for activity change, appetite change and chills. Negative for diaphoresis and fatigue.   HENT: Negative.    Eyes: Negative.    Respiratory: Negative.  Negative for chest tightness and shortness of breath.    Cardiovascular: Negative.  Negative for chest pain, palpitations and leg swelling.   Gastrointestinal: Positive for abdominal pain and nausea. Negative for constipation, diarrhea and vomiting.   Genitourinary: Negative.  Negative for difficulty urinating, dysuria, flank pain, frequency,  "testicular pain and urgency.   Musculoskeletal: Negative.    Neurological: Negative.    Hematological: Negative.    Psychiatric/Behavioral: Negative.        Physical Exam   BP: (!) 141/93  Pulse: 62  Heart Rate: 98  Temp: 96.9  F (36.1  C)  Resp: 18  Height: 170.2 cm (5' 7\")  Weight: 86.2 kg (190 lb)  SpO2: 97 %      Physical Exam   Constitutional: He is oriented to person, place, and time. He appears well-developed and well-nourished. No distress.   HENT:   Head: Normocephalic and atraumatic.   Right Ear: External ear normal.   Left Ear: External ear normal.   Nose: Nose normal.   Mouth/Throat: Oropharynx is clear and moist. No oropharyngeal exudate.   Eyes: Pupils are equal, round, and reactive to light. Conjunctivae and EOM are normal. Right eye exhibits no discharge. Left eye exhibits no discharge.   Neck: Normal range of motion. Neck supple. No thyromegaly present.   Cardiovascular: Normal rate, regular rhythm and normal heart sounds.   Pulmonary/Chest: Effort normal and breath sounds normal. No stridor. No respiratory distress. He has no wheezes.   Abdominal: Soft. Bowel sounds are normal. He exhibits no distension. There is tenderness. There is no guarding.   LLQ tenderness to deep palpation   Musculoskeletal: Normal range of motion. He exhibits no edema or deformity.   Lymphadenopathy:     He has no cervical adenopathy.   Neurological: He is alert and oriented to person, place, and time.   Skin: Skin is warm and dry. Capillary refill takes less than 2 seconds. He is not diaphoretic.   Nursing note and vitals reviewed.      ED Course   Patient seen and examined. Labs ordered. IV fluids and pain meds ordered. Will wait for UA prior to imaging films.   UA and labs reviewed and are normal.  Upright ordered.  Upright shows some air-fluid levels in the right midabdomen with some stool in the left colon.  We will get a CT with IV contrast of the abdomen and pelvis.  Patient has had improvement of his pain with the " fentanyl and Zofran.  He does admit he has been having significant amount of stress but is concerned about the possibility of kidney cancer, as his father had that.  He is also concerned about the possibility of a twisted bowel as he reports now at the end of his visit that he had a history of some twisted bowel about 10 years ago and was taking Metamucil to help that.     Procedures            Results for orders placed or performed during the hospital encounter of 06/01/19 (from the past 24 hour(s))   CBC with platelets differential   Result Value Ref Range    WBC 6.1 4.0 - 11.0 10e9/L    RBC Count 5.11 4.4 - 5.9 10e12/L    Hemoglobin 15.9 13.3 - 17.7 g/dL    Hematocrit 44.5 40.0 - 53.0 %    MCV 87 78 - 100 fl    MCH 31.1 26.5 - 33.0 pg    MCHC 35.7 31.5 - 36.5 g/dL    RDW 12.1 10.0 - 15.0 %    Platelet Count 211 150 - 450 10e9/L    Diff Method Automated Method     % Neutrophils 44.8 %    % Lymphocytes 39.3 %    % Monocytes 9.4 %    % Eosinophils 5.3 %    % Basophils 1.0 %    % Immature Granulocytes 0.2 %    Absolute Neutrophil 2.7 1.6 - 8.3 10e9/L    Absolute Lymphocytes 2.4 0.8 - 5.3 10e9/L    Absolute Monocytes 0.6 0.0 - 1.3 10e9/L    Absolute Eosinophils 0.3 0.0 - 0.7 10e9/L    Absolute Basophils 0.1 0.0 - 0.2 10e9/L    Abs Immature Granulocytes 0.0 0 - 0.4 10e9/L   Comprehensive metabolic panel   Result Value Ref Range    Sodium 137 134 - 144 mmol/L    Potassium 4.3 3.5 - 5.1 mmol/L    Chloride 100 98 - 107 mmol/L    Carbon Dioxide 32 (H) 21 - 31 mmol/L    Anion Gap 5 3 - 14 mmol/L    Glucose 95 70 - 105 mg/dL    Urea Nitrogen 14 7 - 25 mg/dL    Creatinine 1.07 0.70 - 1.30 mg/dL    GFR Estimate 75 >60 mL/min/[1.73_m2]    GFR Estimate If Black >90 >60 mL/min/[1.73_m2]    Calcium 9.3 8.6 - 10.3 mg/dL    Bilirubin Total 0.6 0.3 - 1.0 mg/dL    Albumin 4.5 3.5 - 5.7 g/dL    Protein Total 6.7 6.4 - 8.9 g/dL    Alkaline Phosphatase 58 34 - 104 U/L    ALT 26 7 - 52 U/L    AST 21 13 - 39 U/L   CRP inflammation   Result  Value Ref Range    CRP Inflammation 0.1 <0.5 mg/L   UA reflex to Microscopic and Culture   Result Value Ref Range    Color Urine Yellow     Appearance Urine Clear     Glucose Urine Negative NEG^Negative mg/dL    Bilirubin Urine Negative NEG^Negative    Ketones Urine Negative NEG^Negative mg/dL    Specific Gravity Urine 1.025 1.000 - 1.030    Blood Urine Negative NEG^Negative    pH Urine 6.0 5.0 - 9.0 pH    Protein Albumin Urine Negative NEG^Negative mg/dL    Urobilinogen Urine 0.2 0.2 - 1.0 EU/dL    Nitrite Urine Negative NEG^Negative    Leukocyte Esterase Urine Negative NEG^Negative    Source Midstream Urine    XR Abdomen 2 Views    Narrative    PROCEDURE: XR ABDOMEN 2 VW 6/1/2019 6:23 AM    HISTORY: periumbilical pain- labs normal    COMPARISONS: None.    TECHNIQUE: Flat and upright    FINDINGS: The intestinal gas pattern is normal. There is no  extraluminal gas or pathologic intra-abdominal calcifications         Impression    IMPRESSION: Normal abdominal gas    ALISON RAMAN MD       Medications   0.9% sodium chloride BOLUS (1,000 mLs Intravenous New Bag 6/1/19 0511)   fentaNYL (PF) (SUBLIMAZE) injection 50 mcg (50 mcg Intravenous Given 6/1/19 0510)   ondansetron (ZOFRAN) injection 4 mg (4 mg Intravenous Given 6/1/19 0552)   iohexol (OMNIPAQUE) 350 mg/mL solution 100 mL (100 mLs Intravenous Given 6/1/19 0705)       Assessments & Plan (with Medical Decision Making)     I have reviewed the nursing notes.    I have reviewed the findings, diagnosis, plan and need for follow up with the patient.  Patient signed out at change of shift to Dr Rodriguez.      Medication List      There are no discharge medications for this visit.         Final diagnoses:   Abdominal pain, generalized       6/1/2019   [facility]  06/01/19 [id number]

## 2019-06-03 ENCOUNTER — OFFICE VISIT (OUTPATIENT)
Dept: FAMILY MEDICINE | Facility: OTHER | Age: 44
End: 2019-06-03
Attending: NURSE PRACTITIONER
Payer: COMMERCIAL

## 2019-06-03 ENCOUNTER — TELEPHONE (OUTPATIENT)
Dept: PEDIATRICS | Facility: OTHER | Age: 44
End: 2019-06-03

## 2019-06-03 VITALS
RESPIRATION RATE: 20 BRPM | HEIGHT: 67 IN | WEIGHT: 192.25 LBS | HEART RATE: 72 BPM | TEMPERATURE: 97.3 F | DIASTOLIC BLOOD PRESSURE: 84 MMHG | SYSTOLIC BLOOD PRESSURE: 134 MMHG | BODY MASS INDEX: 30.17 KG/M2

## 2019-06-03 DIAGNOSIS — R10.13 ABDOMINAL PAIN, EPIGASTRIC: Primary | ICD-10-CM

## 2019-06-03 PROCEDURE — G0463 HOSPITAL OUTPT CLINIC VISIT: HCPCS

## 2019-06-03 PROCEDURE — 99214 OFFICE O/P EST MOD 30 MIN: CPT | Performed by: NURSE PRACTITIONER

## 2019-06-03 PROCEDURE — 87338 HPYLORI STOOL AG IA: CPT | Mod: ZL | Performed by: NURSE PRACTITIONER

## 2019-06-03 RX ORDER — OMEPRAZOLE 40 MG/1
40 CAPSULE, DELAYED RELEASE ORAL DAILY
Qty: 60 CAPSULE | Refills: 0 | Status: SHIPPED | OUTPATIENT
Start: 2019-06-03 | End: 2019-06-07

## 2019-06-03 ASSESSMENT — ENCOUNTER SYMPTOMS
DIARRHEA: 0
HEARTBURN: 0
ABDOMINAL PAIN: 1
CHILLS: 0
FEVER: 0
ABDOMINAL DISTENTION: 0
HEMATOCHEZIA: 0
VOMITING: 0
NAUSEA: 0
CONSTIPATION: 0

## 2019-06-03 ASSESSMENT — PAIN SCALES - GENERAL: PAINLEVEL: SEVERE PAIN (6)

## 2019-06-03 ASSESSMENT — MIFFLIN-ST. JEOR: SCORE: 1715.67

## 2019-06-03 NOTE — PROGRESS NOTES
SUBJECTIVE:   Bebeto Prescott is a 44 year old male who presents to clinic today for the following health issues:    HPI Patient presents for evaluation for ER follow-up for acute generalized abdominal pain. Patient was seen in the ER on 6/1. Labs at that visit were uneventful and CT abdomen was negative. He has been taking Tums at home with little relief. He was also started on 20 mg of prilosec, but found little improvement. He has been tailoring his current diet to more bland foods. He reports he notices his pain more on an empty stomach. Symptoms seem to have started after a stomach bug in February, but has been progressing and more frequent prompting the ER visit. Pain localized to epigastric region. He reports no chronic NSAID use. No fever/chills. He has no emesis, nausea, diarrhea or constipation. No recent travel. He reports he was looking up his symptoms and talking with some physician friends and symptoms seem to be consistent with a gastric ulcer. He is a  at a [employer] in [facility]. He reports increased stress with his job.     Patient Active Problem List    Diagnosis Date Noted     Allergic state 01/24/2018     Priority: Medium     Achilles tendonitis, bilateral 01/02/2018     Priority: Medium     Acute prostatitis 02/24/2017     Priority: Medium     Right carpal tunnel syndrome 11/17/2016     Priority: Medium     Radial collateral ligament sprain 10/20/2016     Priority: Medium     Aphthous ulcer 12/20/2011     Priority: Medium     Obsessive-compulsive disorder 06/22/2011     Priority: Medium     Overview:   History of compulsive behaviors, improved on Paxil.       Contact dermatitis and other eczema due to plants (except food) 06/18/2010     Priority: Medium     Past Medical History:   Diagnosis Date     Carpal tunnel syndrome of right wrist     11/17/2016     Radial collateral ligament sprain of elbow     10/20/2016      Past Surgical History:   Procedure Laterality Date     COLONOSCOPY       "2006,Normal,  Follow-up recommended at age 49.     OTHER SURGICAL HISTORY      1999,10276.0,[place] EXCIS SPERMATOCELE,excision of spermatocele and hydrocele, [Other]       Review of Systems   Constitutional: Negative for chills and fever.   Gastrointestinal: Positive for abdominal pain. Negative for abdominal distention, constipation, diarrhea, heartburn, hematochezia, nausea and vomiting.        OBJECTIVE:     /84 (BP Location: Right arm, Patient Position: Sitting, Cuff Size: Adult Regular)   Pulse 72   Temp 97.3  F (36.3  C) (Tympanic)   Resp 20   Ht 1.702 m (5' 7\")   Wt 87.2 kg (192 lb 4 oz)   BMI 30.11 kg/m    Body mass index is 30.11 kg/m .  Physical Exam   Constitutional: He appears well-developed and well-nourished. No distress.   Abdominal: Soft. Bowel sounds are normal. He exhibits no distension and no mass. There is tenderness in the epigastric area. There is no rebound and no guarding.   Skin: Skin is warm and dry.     Diagnostic Test Results:  H Pylori stool: Pending     ASSESSMENT/PLAN:   1. Abdominal pain, epigastric  Patient's symptoms seem consistent with gastris vs. duodenal ulcer. We will increase prilosec to 40 mg daily to see if this improves his symptoms. He will continue on a bland diet and avoid acidic foods. We will attempt a H Pylori stool sample. If no improvement in symptoms patient will likely need an EGD for further diagnostics. Referral completed.  - H Pylori antigen stool  - omeprazole (PRILOSEC) 40 MG DR capsule; Take 1 capsule (40 mg) by mouth daily  Dispense: 60 capsule; Refill: 0  - GASTROENTEROLOGY ADULT REF PROCEDURE ONLY Other; ([facility])    I spent approximately 25 minutes with the patient (exclusive of separately billed services/procedures), with greater than 50% spent in counseling, prognosis, risks and benefits of management or follow-up.  Reviewed importance of compliance with chosen treatment options and follow-up.  Risk factor reduction and patient education and " coordinating care, establishing and/or reviewing the patient's medical record also completed during today's exam .      Vanesa Coffman [facility]

## 2019-06-03 NOTE — TELEPHONE ENCOUNTER
Pt was in ER Sat needs to come in today for follow up and possible refer.for endoscopy-none are the meds helped-

## 2019-06-03 NOTE — TELEPHONE ENCOUNTER
Patient will be seen by Vanesa Coffman NP today at 3:30.  Smiley Lorenzo LPN............6/3/2019 3:25 PM

## 2019-06-03 NOTE — NURSING NOTE
Patient presents to clinic for follow up after ER visit on 06/01/19 for abdominal pain.  He is still experiencing upper abdominal pain rated at 6.  He is requesting referral for Endoscopy.    Medication Reconciliation: complete    Smiley Lorenzo LPN

## 2019-06-04 ENCOUNTER — MYC MEDICAL ADVICE (OUTPATIENT)
Dept: PEDIATRICS | Facility: OTHER | Age: 44
End: 2019-06-04

## 2019-06-04 ENCOUNTER — TELEPHONE (OUTPATIENT)
Dept: SURGERY | Facility: OTHER | Age: 44
End: 2019-06-04

## 2019-06-04 DIAGNOSIS — R10.13 DYSPEPSIA: Primary | ICD-10-CM

## 2019-06-04 DIAGNOSIS — R10.13 EPIGASTRIC PAIN: Primary | ICD-10-CM

## 2019-06-04 LAB
H PYLORI AG STL QL IA: NORMAL
SPECIMEN SOURCE: NORMAL

## 2019-06-04 NOTE — TELEPHONE ENCOUNTER
Patient referred by Vanesa Coffman for a [facility] GI Endoscopy.  Diagnosis is abdominal pain .  Please advise.  Thank you. Mena Harrison on 6/4/2019 at 9:33 AM

## 2019-06-05 ENCOUNTER — NURSE TRIAGE (OUTPATIENT)
Dept: PEDIATRICS | Facility: OTHER | Age: 44
End: 2019-06-05

## 2019-06-05 DIAGNOSIS — Z12.11 ENCOUNTER FOR SCREENING COLONOSCOPY: Primary | ICD-10-CM

## 2019-06-05 DIAGNOSIS — K29.00 ACUTE GASTRITIS WITHOUT HEMORRHAGE, UNSPECIFIED GASTRITIS TYPE: Primary | ICD-10-CM

## 2019-06-05 DIAGNOSIS — R10.13 ABDOMINAL PAIN, EPIGASTRIC: Primary | ICD-10-CM

## 2019-06-05 RX ORDER — SUCRALFATE ORAL 1 G/10ML
1 SUSPENSION ORAL 4 TIMES DAILY
Qty: 600 ML | Refills: 0 | Status: SHIPPED | OUTPATIENT
Start: 2019-06-05 | End: 2019-06-07

## 2019-06-05 NOTE — PROGRESS NOTES
Patient still having abdominal pain despite use of 40 mg of prilosec. We will attempt use of carafate to help aid symptoms until patient can complete EGD on 6/17.

## 2019-06-05 NOTE — TELEPHONE ENCOUNTER
Screening Questions for the Scheduling of Screening Colonoscopies   (If Colonoscopy is diagnostic, Provider should review the chart before scheduling.)  Are you younger than 50 or older than 80?  YES   Do you take aspirin or fish oil?  NO  (if yes, tell patient to stop 1 week prior to Colonoscopy)  Do you take warfarin (Coumadin), clopidogrel (Plavix), apixaban (Eliquis), dabigatram (Pradaxa), rivaroxaban (Xarelto) or any blood thinner? NO   Do you use oxygen at home?  NO   Do you have kidney disease? NO   Are you on dialysis? NO   Have you had a stroke or heart attack in the last year? NO   Have you had a stent in your heart or any blood vessel in the last year? NO  Have you had a transplant of any organ? NO   Have you had a colonoscopy or upper endoscopy (EGD) before? YES - COLONOSCOPY          When?    Date of scheduled EGD  06/10/2018  Provider  [facility]   Pharmacy

## 2019-06-06 ENCOUNTER — TELEPHONE (OUTPATIENT)
Dept: PEDIATRICS | Facility: OTHER | Age: 44
End: 2019-06-06

## 2019-06-06 ENCOUNTER — TRANSFERRED RECORDS (OUTPATIENT)
Dept: HEALTH INFORMATION MANAGEMENT | Facility: OTHER | Age: 44
End: 2019-06-06

## 2019-06-06 RX ORDER — BISACODYL 5 MG/1
TABLET, DELAYED RELEASE ORAL
Qty: 2 TABLET | Refills: 0 | Status: SHIPPED | OUTPATIENT
Start: 2019-06-06 | End: 2019-06-07

## 2019-06-06 NOTE — TELEPHONE ENCOUNTER
Pt states that he is having continued stomach pain and is wanting to get in tomorrow.  Was notified we have nothing open tomorrow and to keep his appt for Monday.  Michaela Hernández LPN ....................  6/6/2019   3:47 PM

## 2019-06-06 NOTE — TELEPHONE ENCOUNTER
Patient called in regards to getting an appointment for today or tomorrow for a f/u on his endoscopy with . Please call him back in regards to this. Thank You!

## 2019-06-07 ENCOUNTER — OFFICE VISIT (OUTPATIENT)
Dept: PEDIATRICS | Facility: OTHER | Age: 44
End: 2019-06-07
Attending: INTERNAL MEDICINE
Payer: COMMERCIAL

## 2019-06-07 VITALS
SYSTOLIC BLOOD PRESSURE: 118 MMHG | RESPIRATION RATE: 16 BRPM | HEART RATE: 78 BPM | TEMPERATURE: 97.4 F | WEIGHT: 191 LBS | BODY MASS INDEX: 29.98 KG/M2 | DIASTOLIC BLOOD PRESSURE: 76 MMHG | HEIGHT: 67 IN

## 2019-06-07 DIAGNOSIS — R10.13 ABDOMINAL PAIN, EPIGASTRIC: ICD-10-CM

## 2019-06-07 DIAGNOSIS — K44.9 HIATAL HERNIA: ICD-10-CM

## 2019-06-07 DIAGNOSIS — K29.70 GASTRITIS WITHOUT BLEEDING, UNSPECIFIED CHRONICITY, UNSPECIFIED GASTRITIS TYPE: Primary | ICD-10-CM

## 2019-06-07 PROCEDURE — G0463 HOSPITAL OUTPT CLINIC VISIT: HCPCS

## 2019-06-07 PROCEDURE — 99213 OFFICE O/P EST LOW 20 MIN: CPT | Performed by: INTERNAL MEDICINE

## 2019-06-07 RX ORDER — SUCRALFATE 1 G/1
1 TABLET ORAL 4 TIMES DAILY
Qty: 300 TABLET | Refills: 11 | Status: SHIPPED | OUTPATIENT
Start: 2019-06-07 | End: 2020-11-05

## 2019-06-07 RX ORDER — OMEPRAZOLE 40 MG/1
40 CAPSULE, DELAYED RELEASE ORAL DAILY
Qty: 90 CAPSULE | Refills: 3 | Status: SHIPPED | OUTPATIENT
Start: 2019-06-07 | End: 2019-08-30

## 2019-06-07 ASSESSMENT — PAIN SCALES - GENERAL: PAINLEVEL: MODERATE PAIN (5)

## 2019-06-07 ASSESSMENT — MIFFLIN-ST. JEOR: SCORE: 1710

## 2019-06-07 ASSESSMENT — PATIENT HEALTH QUESTIONNAIRE - PHQ9: SUM OF ALL RESPONSES TO PHQ QUESTIONS 1-9: 3

## 2019-06-07 NOTE — PATIENT INSTRUCTIONS
-- Continue omeprazole (Prilosec) 40 mg daily   -- Use ranitidine (Zantac) 150 mg twice daily as needed for heart burn, upset stomach.   -- Use sucralfate (Carafate) 2-3 times per day to allow stomach lining to heal   -- Antacids are okay to use as well as needed (eg Tums)   -- Avoid trigger foods (eg spicy foods, caffeine, alcohol -- see longer list below)   -- Avoid NSAIDs (eg ibuprofen/Advil, naproxen/Aleve)   -- Work on healthy weight   -- Reduce stress in your life   -- After your symptoms have improved (around 30 days) consider weaning down on omeprazole   -- Okay to continue ranitidine as needed   -- If symptoms persist or worsen, return as we would consider obtaining endoscopy           Lifestyle Changes for Controlling GERD  When you have GERD, stomach acid feels as if it s backing up toward your mouth. Whether or not you take medicine to control your GERD, your symptoms can often be improved with lifestyle changes. Talk to your healthcare provider about the following suggestions. These suggestions may help you get relief from your symptoms.      Raise your head  Reflux is more likely to strike when you re lying down flat, because stomach fluid can flow backward more easily. Raising the head of your bed 4 to 6 inches can help. To do this:    Slide blocks or books under the legs at the head of your bed. Or, place a wedge under the mattress. [employer] can make a suitable wedge for you. The wedge should run from your waist to the top of your head.    Don t just prop your head on several pillows. This increases pressure on your stomach. It can make GERD worse.  Watch your eating habits  Certain foods may increase the acid in your stomach or relax the lower esophageal sphincter. This makes GERD more likely. It s best to avoid the following if they cause you symptoms:    Coffee, tea, and carbonated drinks (with and without caffeine)    Fatty, fried, or spicy food    Mint, chocolate, onions, and tomatoes     Peppermint    Any other foods that seem to irritate your stomach or cause you pain  Relieve the pressure  Tips include the following:    Eat smaller meals, even if you have to eat more often.    Don t lie down right after you eat. Wait a few hours for your stomach to empty.    Avoid tight belts and tight-fitting clothes.    Lose excess weight.  Tobacco and alcohol  Avoid smoking tobacco and drinking alcohol. They can make GERD symptoms worse.  Date Last Reviewed: 7/1/2016 2000-2017 [employer]. [address] [place] [address], [place] [address]. All rights reserved. This information is not intended as a substitute for professional medical care. Always follow your healthcare professional's instructions.

## 2019-06-07 NOTE — NURSING NOTE
Patient presents to clinic for ongoing stomach pain.  Had endoscopy with Dr. Khan on 6/6/19.  Michaela Hernández LPN ....................  6/7/2019   2:17 PM      No LMP for male patient.  Medication Reconciliation: complete    Michaela Hernández LPN  6/7/2019 2:17 PM

## 2019-06-07 NOTE — PROGRESS NOTES
"Subjective  Bebeto Prescott is a 44 year old male who presents for follow-up endoscopy.  He is been feeling sick for over a week.  He could get into anyone here.  He was able to get Dr. Khan to do an EGD the next day.  I was able to review the report, see media tab.  This revealed gastritis with a small polyp and a small hiatal hernia.  Is been taking Prilosec for a week which was increased from 20 to 40 mg during that time.  He is also been on Carafate for 2 days.  He is frustrated that he is not getting better.  \"I am living on Jell-O, honey and soda crackers.\"  He drinks a lot of coffee 2-3 mugs a day plus espresso.  He has not had any for the last 10 days.  His energy is down.  He describes it as a gnawing sensation in the epigastrium worse in the middle of the night.  He has had a lot of stress \"with good stress\" lately because he bought a [employer].  He is a local .  No heartburn.    Problem List/PMH: reviewed in EMR, and made relevant updates today.  Medications: reviewed in EMR, and made relevant updates today.  Allergies: reviewed in EMR, and made relevant updates today.    Social Hx:  Social History     Tobacco Use     Smoking status: Never Smoker     Smokeless tobacco: Never Used   Substance Use Topics     Alcohol use: No     Alcohol/week: 0.0 oz     Drug use: No     Social History     Social History Narrative    Single.  Recently moved to [place] to  a new [employer].     I reviewed social history and made relevant updates today.    Family Hx:   Family History   Problem Relation Age of Onset     Hyperlipidemia Father         Hyperlipidemia,Hyperlipidemia     Kidney Cancer Father      Other - See Comments Mother         GI Disease,Diverticulosis.     Breast Cancer Sister         Cancer-breast,Possible breast cancer     Other - See Comments Brother         Psychiatric illness,Depression, learning disability     Colon Cancer No family hx of      Prostate Cancer No family hx of  " "      Objective  Vitals: reviewed in EMR.  /76 (BP Location: Right arm, Patient Position: Sitting, Cuff Size: Adult Large)   Pulse 78   Temp 97.4  F (36.3  C) (Tympanic)   Resp 16   Ht 1.702 m (5' 7\")   Wt 86.6 kg (191 lb)   BMI 29.91 kg/m      Gen: Pleasant male, NAD.  HEENT: MMM  Neck: Supple  Pulm: Breathing easily  Neuro: Grossly intact  Skin: No concerning lesions.  Psychiatric: Normal affect and insight. Does not appear anxious or depressed.    Component      Latest Ref Rng & Units 6/1/2019   WBC      4.0 - 11.0 10e9/L 6.1   RBC Count      4.4 - 5.9 10e12/L 5.11   Hemoglobin      13.3 - 17.7 g/dL 15.9   Hematocrit      40.0 - 53.0 % 44.5   MCV      78 - 100 fl 87   MCH      26.5 - 33.0 pg 31.1   MCHC      31.5 - 36.5 g/dL 35.7   RDW      10.0 - 15.0 % 12.1   Platelet Count      150 - 450 10e9/L 211   Diff Method       Automated Method   % Neutrophils      % 44.8   % Lymphocytes      % 39.3   % Monocytes      % 9.4   % Eosinophils      % 5.3   % Basophils      % 1.0   % Immature Granulocytes      % 0.2   Absolute Neutrophil      1.6 - 8.3 10e9/L 2.7   Absolute Lymphocytes      0.8 - 5.3 10e9/L 2.4   Absolute Monocytes      0.0 - 1.3 10e9/L 0.6   Absolute Eosinophils      0.0 - 0.7 10e9/L 0.3   Absolute Basophils      0.0 - 0.2 10e9/L 0.1   Abs Immature Granulocytes      0 - 0.4 10e9/L 0.0   Sodium      134 - 144 mmol/L 137   Potassium      3.5 - 5.1 mmol/L 4.3   Chloride      98 - 107 mmol/L 100   Carbon Dioxide      21 - 31 mmol/L 32 (H)   Anion Gap      3 - 14 mmol/L 5   Glucose      70 - 105 mg/dL 95   Urea Nitrogen      7 - 25 mg/dL 14   Creatinine      0.70 - 1.30 mg/dL 1.07   GFR Estimate      >60 mL/min/1.73:m2 75   GFR Estimate If Black      >60 mL/min/1.73:m2 >90   Calcium      8.6 - 10.3 mg/dL 9.3   Bilirubin Total      0.3 - 1.0 mg/dL 0.6   Albumin      3.5 - 5.7 g/dL 4.5   Protein Total      6.4 - 8.9 g/dL 6.7   Alkaline Phosphatase      34 - 104 U/L 58   ALT      7 - 52 U/L 26   AST     "  13 - 39 U/L 21   Color Urine       Yellow   Appearance Urine       Clear   Glucose Urine      NEG:Negative mg/dL Negative   Bilirubin Urine      NEG:Negative Negative   Ketones Urine      NEG:Negative mg/dL Negative   Specific Gravity Urine      1.000 - 1.030 1.025   Blood Urine      NEG:Negative Negative   pH Urine      5.0 - 9.0 pH 6.0   Protein Albumin Urine      NEG:Negative mg/dL Negative   Urobilinogen Urine      0.2 - 1.0 EU/dL 0.2   Nitrite Urine      NEG:Negative Negative   Leukocyte Esterase Urine      NEG:Negative Negative   Source       Midstream Urine   Specimen Description          H Pylori Antigen          CRP Inflammation      <0.5 mg/L 0.1       Component      Latest Ref Rng & Units 6/3/2019   Specimen Description       Feces   H Pylori Antigen       Negative for Helicobacter pylori antigen by enzyme immunoassay. A negative result . . .     Recent Results (from the past 744 hour(s))   XR Abdomen 2 Views    Narrative    PROCEDURE: XR ABDOMEN 2 VW 6/1/2019 6:23 AM    HISTORY: periumbilical pain- labs normal    COMPARISONS: None.    TECHNIQUE: Flat and upright    FINDINGS: The intestinal gas pattern is normal. There is no  extraluminal gas or pathologic intra-abdominal calcifications         Impression    IMPRESSION: Normal abdominal gas    ALISON RAMAN MD   CT Abdomen Pelvis w Contrast    Narrative    EXAMINATION: CT ABDOMEN PELVIS W CONTRAST, 6/1/2019 7:04 AM    TECHNIQUE:  Helical CT images from the lung bases through the  symphysis pubis were obtained  with IV contrast. Contrast dose:  Omnipaque 350 100mL    COMPARISON: none    HISTORY: Nausea, vomiting; Abd pain, gastroenteritis or colitis  suspected; normal labs with worsening periumbilical pain    FINDINGS:    There is dependent atelectasis at the lung bases.    The liver is free of masses or biliary ductal enlargement. No  calcified gallstones are seen.    The the spleen and pancreas appear normal.    The adrenal glands are normal.    The  right and left kidneys are free of masses or hydronephrosis.    The periaortic lymph nodes are normal in caliber. There is a  retroaortic left renal vein which is of normal variant.    No intraperitoneal masses or inflammatory changes are noted. The  appendix was visualized and is normal    In the pelvis the bladder and rectum appear normal.    Scheuermann's changes are present in the lower thoracic and upper  lumbar spine      Impression    IMPRESSION: Negative CT scan of the abdomen and pelvis. No  intra-abdominal masses or inflammatory changes     ALISON RAMAN MD         Assessment    ICD-10-CM    1. Gastritis without bleeding, unspecified chronicity, unspecified gastritis type K29.70 sucralfate (CARAFATE) 1 GM tablet     ranitidine (ZANTAC) 150 MG tablet   2. Abdominal pain, epigastric R10.13 omeprazole (PRILOSEC) 40 MG DR capsule   3. Hiatal hernia K44.9 sucralfate (CARAFATE) 1 GM tablet     ranitidine (ZANTAC) 150 MG tablet     We had a lengthy discussion today with regards to gastritis including pathophysiology, expected clinical course, possible complications.  I urged patients with the healing process.  Adjusted Carafate from liquid to tablets.  Recommend the addition of Zantac as needed.  Lifestyle modification is indicated.    Plan   -- Expected clinical course discussed   -- Medications and their side effects discussed  Patient Instructions      -- Continue omeprazole (Prilosec) 40 mg daily   -- Use ranitidine (Zantac) 150 mg twice daily as needed for heart burn, upset stomach.   -- Use sucralfate (Carafate) 2-3 times per day to allow stomach lining to heal   -- Antacids are okay to use as well as needed (eg Tums)   -- Avoid trigger foods (eg spicy foods, caffeine, alcohol -- see longer list below)   -- Avoid NSAIDs (eg ibuprofen/Advil, naproxen/Aleve)   -- Work on healthy weight   -- Reduce stress in your life   -- After your symptoms have improved (around 30 days) consider weaning down on  omeprazole   -- Okay to continue ranitidine as needed   -- If symptoms persist or worsen, return as we would consider obtaining endoscopy           Lifestyle Changes for Controlling GERD  When you have GERD, stomach acid feels as if it s backing up toward your mouth. Whether or not you take medicine to control your GERD, your symptoms can often be improved with lifestyle changes. Talk to your healthcare provider about the following suggestions. These suggestions may help you get relief from your symptoms.      Raise your head  Reflux is more likely to strike when you re lying down flat, because stomach fluid can flow backward more easily. Raising the head of your bed 4 to 6 inches can help. To do this:    Slide blocks or books under the legs at the head of your bed. Or, place a wedge under the mattress. Many [employer] can make a suitable wedge for you. The wedge should run from your waist to the top of your head.    Don t just prop your head on several pillows. This increases pressure on your stomach. It can make GERD worse.  Watch your eating habits  Certain foods may increase the acid in your stomach or relax the lower esophageal sphincter. This makes GERD more likely. It s best to avoid the following if they cause you symptoms:    Coffee, tea, and carbonated drinks (with and without caffeine)    Fatty, fried, or spicy food    Mint, chocolate, onions, and tomatoes    Peppermint    Any other foods that seem to irritate your stomach or cause you pain  Relieve the pressure  Tips include the following:    Eat smaller meals, even if you have to eat more often.    Don t lie down right after you eat. Wait a few hours for your stomach to empty.    Avoid tight belts and tight-fitting clothes.    Lose excess weight.  Tobacco and alcohol  Avoid smoking tobacco and drinking alcohol. They can make GERD symptoms worse.  Date Last Reviewed: 7/1/2016 2000-2017 [employer]. [address] [facility], [place]  [address]. All rights reserved. This information is not intended as a substitute for professional medical care. Always follow your healthcare professional's instructions.      Signed, Bebeto Feldman MD  Internal Medicine & Pediatrics

## 2019-06-17 NOTE — ED PROVIDER NOTES
History     Chief Complaint   Patient presents with     Abdominal Pain     HPI  Bebeto Prescott is a 44 year old male who I assumed care from DR Spencer at shift change AT time of transition of care awaiting results of CT scan of abdomen. Patient resting with improvement in pain      Allergies:  Allergies   Allergen Reactions     Mold Other (See Comments)     Moldy leaves: sneezing, runny nose, cough       Problem List:    Patient Active Problem List    Diagnosis Date Noted     Allergic state 01/24/2018     Priority: Medium     Achilles tendonitis, bilateral 01/02/2018     Priority: Medium     Acute prostatitis 02/24/2017     Priority: Medium     Right carpal tunnel syndrome 11/17/2016     Priority: Medium     Radial collateral ligament sprain 10/20/2016     Priority: Medium     Aphthous ulcer 12/20/2011     Priority: Medium     Obsessive-compulsive disorder 06/22/2011     Priority: Medium     Overview:   History of compulsive behaviors, improved on Paxil.       Contact dermatitis and other eczema due to plants (except food) 06/18/2010     Priority: Medium        Past Medical History:    Past Medical History:   Diagnosis Date     Carpal tunnel syndrome of right wrist      Radial collateral ligament sprain of elbow        Past Surgical History:    Past Surgical History:   Procedure Laterality Date     COLONOSCOPY      2006,Normal,  Follow-up recommended at age 49.     OTHER SURGICAL HISTORY      1999,74085.0,TX EXCIS SPERMATOCELE,excision of spermatocele and hydrocele, [Other]       Family History:    Family History   Problem Relation Age of Onset     Hyperlipidemia Father         Hyperlipidemia,Hyperlipidemia     Kidney Cancer Father      Other - See Comments Mother         GI Disease,Diverticulosis.     Breast Cancer Sister         Cancer-breast,Possible breast cancer     Other - See Comments Brother         Psychiatric illness,Depression, learning disability     Colon Cancer No family hx of      Prostate Cancer No  "family hx of        Social History:  Marital Status:   [2]  Social History     Tobacco Use     Smoking status: Never Smoker     Smokeless tobacco: Never Used   Substance Use Topics     Alcohol use: No     Alcohol/week: 0.0 oz     Drug use: No        Medications:      omeprazole (PRILOSEC) 40 MG DR capsule   PARoxetine (PAXIL) 10 MG tablet   ranitidine (ZANTAC) 150 MG tablet   sucralfate (CARAFATE) 1 GM tablet         Review of Systems  Per previous provider   Physical Exam   BP: (!) 141/93  Pulse: 62  Heart Rate: 98  Temp: 96.9  F (36.1  C)  Resp: 18  Height: 170.2 cm (5' 7\")  Weight: 86.2 kg (190 lb)  SpO2: 97 %      Physical Exam per previous provider     ED Course        Procedures          {CT scan returned showing no acute abnormality . Patient still very anxious and concerned regarding diagnosis. Reviewed symptoms again with patient . Given high level of stress and history of symptoms very likely could be gastritis. Patient given GI cocktail as abdominal discomfort starting to recur and states was helpful . Patient will be discharged with 30 day supply of PPi . Recommend he follow up with PCP as would likely benefit from further work up including EGD . Patient very concerned about diagnosis of cancer . Patient reassured that although I cannot completely exclude a diagnosis of cancer there are no findings on todays evaluation to suggest a diagnosis of cancer          No results found for this or any previous visit (from the past 24 hour(s)).    Medications   0.9% sodium chloride BOLUS (0 mLs Intravenous Stopped 6/1/19 0924)   fentaNYL (PF) (SUBLIMAZE) injection 50 mcg (50 mcg Intravenous Given 6/1/19 0510)   ondansetron (ZOFRAN) injection 4 mg (4 mg Intravenous Given 6/1/19 1087)   iohexol (OMNIPAQUE) 350 mg/mL solution 100 mL (100 mLs Intravenous Given 6/1/19 0705)   alum & mag hydroxide-simethicone (MYLANTA ES/MAALOX  ES) suspension 15 mL (15 mLs Oral Given 6/1/19 0817)     And   lidocaine HCl " (XYLOCAINE) 2 % solution 15 mL (15 mLs Mouth/Throat Given 6/1/19 0818)   pantoprazole (PROTONIX) 40 mg IV push injection (40 mg Intravenous Given 6/1/19 0818)       Assessments & Plan (with Medical Decision Making)     I have reviewed the nursing notes.    I have reviewed the findings, diagnosis, plan and need for follow up with the patient.         Medication List      There are no discharge medications for this visit.         Final diagnoses:   Abdominal pain, generalized   Anxiety       6/1/2019   [facility] AND [facility] Emma Valerio MD  06/17/19 [id number]

## 2019-07-05 ENCOUNTER — ALLIED HEALTH/NURSE VISIT (OUTPATIENT)
Dept: FAMILY MEDICINE | Facility: OTHER | Age: 44
End: 2019-07-05
Attending: INTERNAL MEDICINE
Payer: COMMERCIAL

## 2019-07-05 DIAGNOSIS — Z53.9 ERRONEOUS ENCOUNTER--DISREGARD: Primary | ICD-10-CM

## 2019-07-05 NOTE — PROGRESS NOTES
Pt here to get a  Tetnus shot but he is up to date since he had all childhood shots just not in [facility] system. Education with [facility] schedule given , he would only needed another shot of TD if he did not get baby shots which he reports he did get shots as child. He will bring information of shots to load  Into [facility] and drop off at desk for NP nurse to enter into system for him.  Appointment cancelled.

## 2019-08-29 DIAGNOSIS — R10.13 ABDOMINAL PAIN, EPIGASTRIC: ICD-10-CM

## 2019-08-30 RX ORDER — OMEPRAZOLE 40 MG/1
40 CAPSULE, DELAYED RELEASE ORAL DAILY
Qty: 90 CAPSULE | Refills: 2 | Status: SHIPPED | OUTPATIENT
Start: 2019-08-30 | End: 2020-11-05

## 2019-08-30 NOTE — TELEPHONE ENCOUNTER
"Requested Prescriptions   Pending Prescriptions Disp Refills     omeprazole (PRILOSEC) 40 MG DR capsule 90 capsule 3     Sig: Take 1 capsule (40 mg) by mouth daily       PPI Protocol Passed - 8/30/2019 12:35 PM        Passed - Not on Clopidogrel (unless Pantoprazole ordered)        Passed - No diagnosis of osteoporosis on record        Passed - Recent (12 mo) or future (30 days) visit within the authorizing provider's specialty     Patient had office visit in the last 12 months or has a visit in the next 30 days with authorizing provider or within the authorizing provider's specialty.  See \"Patient Info\" tab in inbasket, or \"Choose Columns\" in Meds & Orders section of the refill encounter.              Passed - Medication is active on med list        Passed - Patient is age 18 or older        LOV 6/7/19 was given #90 with 3 refills. Will resend refill at this time as appears pharmacy did not receive RX. Prescription approved per [facility] Ref[facility] Protocol.  Carla Martinez RN on 8/30/2019 at 12:36 PM    "

## 2019-10-18 ENCOUNTER — OFFICE VISIT (OUTPATIENT)
Dept: INTERNAL MEDICINE | Facility: OTHER | Age: 44
End: 2019-10-18
Attending: NURSE PRACTITIONER
Payer: COMMERCIAL

## 2019-10-18 VITALS
OXYGEN SATURATION: 98 % | DIASTOLIC BLOOD PRESSURE: 80 MMHG | RESPIRATION RATE: 16 BRPM | SYSTOLIC BLOOD PRESSURE: 138 MMHG | HEART RATE: 64 BPM | BODY MASS INDEX: 31.48 KG/M2 | TEMPERATURE: 94.3 F | WEIGHT: 201 LBS

## 2019-10-18 DIAGNOSIS — R30.0 BURNING WITH URINATION: ICD-10-CM

## 2019-10-18 DIAGNOSIS — N41.0 ACUTE PROSTATITIS: ICD-10-CM

## 2019-10-18 DIAGNOSIS — R39.9 UTI SYMPTOMS: Primary | ICD-10-CM

## 2019-10-18 LAB
ALBUMIN UR-MCNC: NEGATIVE MG/DL
ANION GAP SERPL CALCULATED.3IONS-SCNC: 6 MMOL/L (ref 3–14)
APPEARANCE UR: CLEAR
BILIRUB UR QL STRIP: NEGATIVE
BUN SERPL-MCNC: 15 MG/DL (ref 7–25)
CALCIUM SERPL-MCNC: 9.1 MG/DL (ref 8.6–10.3)
CHLORIDE SERPL-SCNC: 102 MMOL/L (ref 98–107)
CO2 SERPL-SCNC: 30 MMOL/L (ref 21–31)
COLOR UR AUTO: YELLOW
CREAT SERPL-MCNC: 1.06 MG/DL (ref 0.7–1.3)
ERYTHROCYTE [DISTWIDTH] IN BLOOD BY AUTOMATED COUNT: 12 % (ref 10–15)
GFR SERPL CREATININE-BSD FRML MDRD: 76 ML/MIN/{1.73_M2}
GLUCOSE SERPL-MCNC: 99 MG/DL (ref 70–105)
GLUCOSE UR STRIP-MCNC: NEGATIVE MG/DL
HCT VFR BLD AUTO: 44 % (ref 40–53)
HGB BLD-MCNC: 15.1 G/DL (ref 13.3–17.7)
HGB UR QL STRIP: NEGATIVE
KETONES UR STRIP-MCNC: NEGATIVE MG/DL
LEUKOCYTE ESTERASE UR QL STRIP: NEGATIVE
MCH RBC QN AUTO: 30.2 PG (ref 26.5–33)
MCHC RBC AUTO-ENTMCNC: 34.3 G/DL (ref 31.5–36.5)
MCV RBC AUTO: 88 FL (ref 78–100)
NITRATE UR QL: NEGATIVE
PH UR STRIP: 6 PH (ref 5–9)
PLATELET # BLD AUTO: 242 10E9/L (ref 150–450)
POTASSIUM SERPL-SCNC: 4 MMOL/L (ref 3.5–5.1)
PSA SERPL-MCNC: 6.01 NG/ML
RBC # BLD AUTO: 5 10E12/L (ref 4.4–5.9)
SODIUM SERPL-SCNC: 138 MMOL/L (ref 134–144)
SOURCE: ABNORMAL
SP GR UR STRIP: >1.03 (ref 1–1.03)
UROBILINOGEN UR STRIP-ACNC: 0.2 EU/DL (ref 0.2–1)
WBC # BLD AUTO: 6.3 10E9/L (ref 4–11)

## 2019-10-18 PROCEDURE — 99214 OFFICE O/P EST MOD 30 MIN: CPT | Performed by: NURSE PRACTITIONER

## 2019-10-18 PROCEDURE — 80048 BASIC METABOLIC PNL TOTAL CA: CPT | Mod: ZL | Performed by: NURSE PRACTITIONER

## 2019-10-18 PROCEDURE — 81003 URINALYSIS AUTO W/O SCOPE: CPT | Mod: ZL | Performed by: NURSE PRACTITIONER

## 2019-10-18 PROCEDURE — 84153 ASSAY OF PSA TOTAL: CPT | Mod: ZL | Performed by: NURSE PRACTITIONER

## 2019-10-18 PROCEDURE — 36415 COLL VENOUS BLD VENIPUNCTURE: CPT | Mod: ZL | Performed by: NURSE PRACTITIONER

## 2019-10-18 PROCEDURE — 87086 URINE CULTURE/COLONY COUNT: CPT | Mod: ZL | Performed by: NURSE PRACTITIONER

## 2019-10-18 PROCEDURE — 85027 COMPLETE CBC AUTOMATED: CPT | Mod: ZL | Performed by: NURSE PRACTITIONER

## 2019-10-18 PROCEDURE — G0463 HOSPITAL OUTPT CLINIC VISIT: HCPCS

## 2019-10-18 RX ORDER — SULFAMETHOXAZOLE/TRIMETHOPRIM 800-160 MG
1 TABLET ORAL 2 TIMES DAILY
Qty: 28 TABLET | Refills: 3 | Status: SHIPPED | OUTPATIENT
Start: 2019-10-18 | End: 2020-01-02

## 2019-10-18 ASSESSMENT — PAIN SCALES - GENERAL: PAINLEVEL: MILD PAIN (2)

## 2019-10-18 NOTE — PROGRESS NOTES
Subjective:  He is here today for burning with urination.  This began about 2 to 3 weeks ago.  It actually occurred while he was having intercourse with his wife.  They are in a monogamous relationship.  He is not concerned for any STDs.  The next morning he got up and there was some burning with urination which lasted most of the day.  Since that time it has lessened to a dull ache but is present at the end of urination.  He has not had any pain or pressure with passing bowel movements.  Denies fever and chills.  No hematuria or flank pain.  According to his past medical history this shows that he has had history of acute prostatitis in the past.  He believes that he may have had a bladder infection many many years ago but cannot recall anything that was diagnosed as prostatitis.    Patient Active Problem List   Diagnosis     Achilles tendonitis, bilateral     Acute prostatitis     Allergic state     Aphthous ulcer     Obsessive-compulsive disorder     Contact dermatitis and other eczema due to plants (except food)     Radial collateral ligament sprain     Right carpal tunnel syndrome     Past Medical History:   Diagnosis Date     Carpal tunnel syndrome of right wrist     11/17/2016     Radial collateral ligament sprain of elbow     10/20/2016     Past Surgical History:   Procedure Laterality Date     COLONOSCOPY      2006,Normal,  Follow-up recommended at age 49.     OTHER SURGICAL HISTORY      1999,17893.0,OR EXCIS SPERMATOCELE,excision of spermatocele and hydrocele, [Other]     Social History     Socioeconomic History     Marital status:      Spouse name: Not on file     Number of children: Not on file     Years of education: Not on file     Highest education level: Not on file   Occupational History     Not on file   Social Needs     Financial resource strain: Not on file     Food insecurity:     Worry: Not on file     Inability: Not on file     Transportation needs:     Medical: Not on file      Non-medical: Not on file   Tobacco Use     Smoking status: Never Smoker     Smokeless tobacco: Never Used   Substance and Sexual Activity     Alcohol use: No     Alcohol/week: 0.0 standard drinks     Drug use: No     Sexual activity: Yes     Partners: Female     Birth control/protection: None   Lifestyle     Physical activity:     Days per week: Not on file     Minutes per session: Not on file     Stress: Not on file   Relationships     Social connections:     Talks on phone: Not on file     Gets together: Not on file     Attends Hinduism service: Not on file     Active member of club or organization: Not on file     Attends meetings of clubs or organizations: Not on file     Relationship status: Not on file     Intimate partner violence:     Fear of current or ex partner: Not on file     Emotionally abused: Not on file     Physically abused: Not on file     Forced sexual activity: Not on file   Other Topics Concern     Parent/sibling w/ CABG, MI or angioplasty before 65F 55M? Not Asked   Social History Narrative    Single.  Recently moved to [place] to [facility] a new [employer].     Family History   Problem Relation Age of Onset     Hyperlipidemia Father         Hyperlipidemia,Hyperlipidemia     Kidney Cancer Father      Other - See Comments Mother         GI Disease,Diverticulosis.     Breast Cancer Sister         Cancer-breast,Possible breast cancer     Other - See Comments Brother         Psychiatric illness,Depression, learning disability     Colon Cancer No family hx of      Prostate Cancer No family hx of      Current Outpatient Medications   Medication Sig Dispense Refill     sulfamethoxazole-trimethoprim (BACTRIM DS/SEPTRA DS) 800-160 MG tablet Take 1 tablet by mouth 2 times daily for 14 days 28 tablet 3     omeprazole (PRILOSEC) 40 MG DR capsule Take 1 capsule (40 mg) by mouth daily 90 capsule 2     PARoxetine (PAXIL) 10 MG tablet Take 1 tablet (10 mg) by mouth every morning 90 tablet 3     sucralfate  (CARAFATE) 1 GM tablet Take 1 tablet (1 g) by mouth 4 times daily 300 tablet 11     Mold      Review of Systems:  Review of Systems  See HPI  Objective:   /80 (BP Location: Right arm, Patient Position: Sitting, Cuff Size: Adult Regular)   Pulse 64   Temp 94.3  F (34.6  C) (Tympanic)   Resp 16   Wt 91.2 kg (201 lb)   SpO2 98%   BMI 31.48 kg/m    Physical Exam  Pleasant gentleman in no acute distress.  Affect normal.  Alert and oriented x4.  Skin color pink.  Mucous memories moist.  Lung fields clear to auscultation.  Cardiovascular regular rate and rhythm.  Abdomen is soft without masses, tenderness and organomegaly.  No CVA or suprapubic tenderness.    Results for orders placed or performed in visit on 10/18/19   *UA reflex to Microscopic   Result Value Ref Range    Color Urine Yellow     Appearance Urine Clear     Glucose Urine Negative NEG^Negative mg/dL    Bilirubin Urine Negative NEG^Negative    Ketones Urine Negative NEG^Negative mg/dL    Specific Gravity Urine >1.030 (H) 1.000 - 1.030    Blood Urine Negative NEG^Negative    pH Urine 6.0 5.0 - 9.0 pH    Protein Albumin Urine Negative NEG^Negative mg/dL    Urobilinogen Urine 0.2 0.2 - 1.0 EU/dL    Nitrite Urine Negative NEG^Negative    Leukocyte Esterase Urine Negative NEG^Negative    Source Midstream Urine    Prostate Specific Antigen   Result Value Ref Range    Prostate Specific Antigen 6.006 (H) <3.100 ng/mL   CBC with platelets   Result Value Ref Range    WBC 6.3 4.0 - 11.0 10e9/L    RBC Count 5.00 4.4 - 5.9 10e12/L    Hemoglobin 15.1 13.3 - 17.7 g/dL    Hematocrit 44.0 40.0 - 53.0 %    MCV 88 78 - 100 fl    MCH 30.2 26.5 - 33.0 pg    MCHC 34.3 31.5 - 36.5 g/dL    RDW 12.0 10.0 - 15.0 %    Platelet Count 242 150 - 450 10e9/L   Basic metabolic panel   Result Value Ref Range    Sodium 138 134 - 144 mmol/L    Potassium 4.0 3.5 - 5.1 mmol/L    Chloride 102 98 - 107 mmol/L    Carbon Dioxide 30 21 - 31 mmol/L    Anion Gap 6 3 - 14 mmol/L    Glucose 99  70 - 105 mg/dL    Urea Nitrogen 15 7 - 25 mg/dL    Creatinine 1.06 0.70 - 1.30 mg/dL    GFR Estimate 76 >60 mL/min/[1.73_m2]    GFR Estimate If Black >90 >60 mL/min/[1.73_m2]    Calcium 9.1 8.6 - 10.3 mg/dL     Assessment:    ICD-10-CM    1. UTI symptoms R39.9 *UA reflex to Microscopic     CBC with platelets     Basic metabolic panel     Prostate Specific Antigen     Prostate Specific Antigen     CBC with platelets     Basic metabolic panel     Urine Culture Aerobic Bacterial   2. Burning with urination R30.0 CBC with platelets     Basic metabolic panel     Prostate Specific Antigen     Prostate Specific Antigen     CBC with platelets     Basic metabolic panel     Urine Culture Aerobic Bacterial   3. Acute prostatitis N41.0 UROLOGY ADULT REFERRAL     sulfamethoxazole-trimethoprim (BACTRIM DS/SEPTRA DS) 800-160 MG tablet       Plan:   Patient will be started on Bactrim DS 1 tablet twice daily for 14 days.  Side effects of medication reviewed and discussed.  He also was referred to urology and will need to follow-up to make sure PSA returns back to normal and then infection resolves.  If he develops fever, chills, hematuria, worsening of burning with urination, pain with defecation, etc. then recommend that he a follow-up visit.  Urine specific gravity also was elevated recommend increasing water intake.    DOUG Carter   10/18/2019  9:47 AM

## 2019-10-18 NOTE — NURSING NOTE
Chief Complaint   Patient presents with     Urinary Problem       Medication Reconciliation: complete  Carrie De La Vega LPN  ..................10/18/2019   9:28 AM

## 2019-10-20 LAB
BACTERIA SPEC CULT: NORMAL
SPECIMEN SOURCE: NORMAL

## 2019-10-31 ENCOUNTER — OFFICE VISIT (OUTPATIENT)
Dept: UROLOGY | Facility: OTHER | Age: 44
End: 2019-10-31
Attending: NURSE PRACTITIONER
Payer: COMMERCIAL

## 2019-10-31 VITALS
HEART RATE: 68 BPM | BODY MASS INDEX: 31.61 KG/M2 | SYSTOLIC BLOOD PRESSURE: 112 MMHG | WEIGHT: 201.8 LBS | DIASTOLIC BLOOD PRESSURE: 62 MMHG | RESPIRATION RATE: 16 BRPM

## 2019-10-31 DIAGNOSIS — N41.0 ACUTE PROSTATITIS: Primary | ICD-10-CM

## 2019-10-31 DIAGNOSIS — R97.20 ELEVATED PSA: Primary | ICD-10-CM

## 2019-10-31 DIAGNOSIS — N41.0 ACUTE PROSTATITIS: ICD-10-CM

## 2019-10-31 DIAGNOSIS — R97.20 ELEVATED PSA: ICD-10-CM

## 2019-10-31 PROCEDURE — G0463 HOSPITAL OUTPT CLINIC VISIT: HCPCS

## 2019-10-31 PROCEDURE — 99203 OFFICE O/P NEW LOW 30 MIN: CPT | Performed by: UROLOGY

## 2019-10-31 ASSESSMENT — PAIN SCALES - GENERAL: PAINLEVEL: NO PAIN (0)

## 2019-10-31 NOTE — NURSING NOTE
Pt presents to clinic for acute parostitis    Review of Systems:    Weight loss:    No     Recent fever/chills:  No   Night sweats:   No  Current skin rash:  No   Recent hair loss:  No  Heat intolerance:  No   Cold intolerance:  No  Chest pain:   No   Palpitations:   No  Shortness of breath:  No   Wheezing:   No  Constipation:    No   Diarrhea:   No   Nausea:   No   Vomiting:   No   Kidney/side pain:  No   Back pain:   No  Frequent headaches:  No   Dizziness:     No  Leg swelling:   No   Calf pain:    No    Parents, brothers or sisters with history of kidney cancer:   Yes  Father  Parents, brothers or sisters with history of bladder cancer: No  Father or brother with history of prostate cancer:  No

## 2019-10-31 NOTE — PROGRESS NOTES
I was asked to see this patient by Kiara Bright NP and provide my opinion about the following:  Elevated PSA    Type of Visit  Consult    Chief Complaint  Elevated PSA    HPI  Mr. Prescott is a 44 year old male who presents with an elevated PSA collected at the time of a diagnosed UTI.  He does not have a family history of prostate cancer.  The patient has not previously undergone prostate biopsy.  The patient did have an associated worsening of LUTS, dysuria at the time of the PSA.  He was diagnosed with UTI  The most recent PSA was collected 2 weeks ago.      Past Medical History  He  has a past medical history of Carpal tunnel syndrome of right wrist and Radial collateral ligament sprain of elbow.  Patient Active Problem List   Diagnosis     Achilles tendonitis, bilateral     Acute prostatitis     Allergic state     Aphthous ulcer     Obsessive-compulsive disorder     Contact dermatitis and other eczema due to plants (except food)     Radial collateral ligament sprain     Right carpal tunnel syndrome       Past Surgical History  He  has a past surgical history that includes Colonoscopy and other surgical history.    Medications  He has a current medication list which includes the following prescription(s): omeprazole, paroxetine, sucralfate, and sulfamethoxazole-trimethoprim.    Allergies  Allergies   Allergen Reactions     Mold Other (See Comments)     Moldy leaves: sneezing, runny nose, cough       Social History  He  reports that he has never smoked. He has never used smokeless tobacco. He reports that he does not drink alcohol or use drugs.  No drug abuse.    Family History  Family History   Problem Relation Age of Onset     Hyperlipidemia Father         Hyperlipidemia,Hyperlipidemia     Kidney Cancer Father      Other - See Comments Mother         GI Disease,Diverticulosis.     Breast Cancer Sister         Cancer-breast,Possible breast cancer     Other - See Comments Brother         Psychiatric  illness,Depression, learning disability     Colon Cancer No family hx of      Prostate Cancer No family hx of        Review of Systems  I personally reviewed the ROS with the patient.    Nursing Notes:   Jill Riley LPN  10/31/2019 10:01 AM  Signed  Pt presents to clinic for acute parostitis    Review of Systems:    Weight loss:    No     Recent fever/chills:  No   Night sweats:   No  Current skin rash:  No   Recent hair loss:  No  Heat intolerance:  No   Cold intolerance:  No  Chest pain:   No   Palpitations:   No  Shortness of breath:  No   Wheezing:   No  Constipation:    No   Diarrhea:   No   Nausea:   No   Vomiting:   No   Kidney/side pain:  No   Back pain:   No  Frequent headaches:  No   Dizziness:     No  Leg swelling:   No   Calf pain:    No    Parents, brothers or sisters with history of kidney cancer:   Yes  Father  Parents, brothers or sisters with history of bladder cancer: No  Father or brother with history of prostate cancer:  No        Physical Exam  Vitals:    10/31/19 0944   BP: 112/62   BP Location: Left arm   Patient Position: Sitting   Cuff Size: Adult Regular   Pulse: 68   Resp: 16   Weight: 91.5 kg (201 lb 12.8 oz)   Constitutional: No acute distress.  Alert and cooperative   Head: NCAT  Eyes: Conjunctivae normal  Cardiovascular: Regular rate.  Pulmonary/Chest: Respirations are even and non-labored bilaterally, no audible wheezing  Abdominal: Soft. No distension, tenderness, masses or guarding.   Neurological: A + O x 3.  Cranial Nerves II-XII grossly intact.  Extremities: EDILIA x 4, Warm. No clubbing.  No cyanosis.    Skin: Pink, warm and dry.  No visible rashes noted.  Psychiatric:  Normal mood and affect  Back:  No left CVA tenderness.  No right CVA tenderness.  Genitourinary:  Nonpalpable bladder    Labs  Results for orders placed or performed in visit on 10/18/19   *UA reflex to Microscopic   Result Value Ref Range    Color Urine Yellow     Appearance Urine Clear     Glucose Urine  Negative NEG^Negative mg/dL    Bilirubin Urine Negative NEG^Negative    Ketones Urine Negative NEG^Negative mg/dL    Specific Gravity Urine >1.030 (H) 1.000 - 1.030    Blood Urine Negative NEG^Negative    pH Urine 6.0 5.0 - 9.0 pH    Protein Albumin Urine Negative NEG^Negative mg/dL    Urobilinogen Urine 0.2 0.2 - 1.0 EU/dL    Nitrite Urine Negative NEG^Negative    Leukocyte Esterase Urine Negative NEG^Negative    Source Midstream Urine    Prostate Specific Antigen   Result Value Ref Range    Prostate Specific Antigen 6.006 (H) <3.100 ng/mL   CBC with platelets   Result Value Ref Range    WBC 6.3 4.0 - 11.0 10e9/L    RBC Count 5.00 4.4 - 5.9 10e12/L    Hemoglobin 15.1 13.3 - 17.7 g/dL    Hematocrit 44.0 40.0 - 53.0 %    MCV 88 78 - 100 fl    MCH 30.2 26.5 - 33.0 pg    MCHC 34.3 31.5 - 36.5 g/dL    RDW 12.0 10.0 - 15.0 %    Platelet Count 242 150 - 450 10e9/L   Basic metabolic panel   Result Value Ref Range    Sodium 138 134 - 144 mmol/L    Potassium 4.0 3.5 - 5.1 mmol/L    Chloride 102 98 - 107 mmol/L    Carbon Dioxide 30 21 - 31 mmol/L    Anion Gap 6 3 - 14 mmol/L    Glucose 99 70 - 105 mg/dL    Urea Nitrogen 15 7 - 25 mg/dL    Creatinine 1.06 0.70 - 1.30 mg/dL    GFR Estimate 76 >60 mL/min/[1.73_m2]    GFR Estimate If Black >90 >60 mL/min/[1.73_m2]    Calcium 9.1 8.6 - 10.3 mg/dL   Urine Culture Aerobic Bacterial   Result Value Ref Range    Specimen Description Midstream Urine     Culture Micro       Urine culture negative (no growth of uropathogens).       Assessment  Mr. Prescott is a 44 year old male who presents with elevated PSA, however the value was collected at the time of UTI.    Discussed the risks of biopsy leading to overdiagnosis and overtreatment.    We discussed the fact that PSA is an inflammatory marker and often elevated in settings of UTI.    Plan  Follow up PSA in 2 months

## 2020-01-02 ENCOUNTER — OFFICE VISIT (OUTPATIENT)
Dept: UROLOGY | Facility: OTHER | Age: 45
End: 2020-01-02
Attending: UROLOGY
Payer: MEDICAID

## 2020-01-02 VITALS
BODY MASS INDEX: 32.11 KG/M2 | WEIGHT: 205 LBS | RESPIRATION RATE: 14 BRPM | HEART RATE: 68 BPM | DIASTOLIC BLOOD PRESSURE: 80 MMHG | SYSTOLIC BLOOD PRESSURE: 130 MMHG

## 2020-01-02 DIAGNOSIS — R97.20 ELEVATED PSA: Primary | ICD-10-CM

## 2020-01-02 DIAGNOSIS — R97.20 ELEVATED PSA: ICD-10-CM

## 2020-01-02 LAB — PSA SERPL-MCNC: 3.02 NG/ML

## 2020-01-02 PROCEDURE — 84153 ASSAY OF PSA TOTAL: CPT | Mod: ZL | Performed by: UROLOGY

## 2020-01-02 PROCEDURE — G0463 HOSPITAL OUTPT CLINIC VISIT: HCPCS

## 2020-01-02 PROCEDURE — 99213 OFFICE O/P EST LOW 20 MIN: CPT | Performed by: UROLOGY

## 2020-01-02 PROCEDURE — G0463 HOSPITAL OUTPT CLINIC VISIT: HCPCS | Performed by: UROLOGY

## 2020-01-02 PROCEDURE — 36415 COLL VENOUS BLD VENIPUNCTURE: CPT | Mod: ZL | Performed by: UROLOGY

## 2020-01-02 ASSESSMENT — PAIN SCALES - GENERAL: PAINLEVEL: NO PAIN (0)

## 2020-01-02 NOTE — PROGRESS NOTES
Type of Visit  EST    Chief Complaint  Elevated PSA    HPI  Mr. Prescott is a 44 year old male who follows up with an elevated PSA collected at the time of a diagnosed UTI.  He does not have a family history of prostate cancer.  The patient has not previously undergone prostate biopsy.  The patient completed the course of antibiotics and his symptoms have completely resolved.  I recommended a serial PSA follow-up in 2 months given the likelihood of false elevated PSA.  He underwent PSA earlier today and follows up to review the results.  He denies dysuria and gross hematuria today.      Family History  Family History   Problem Relation Age of Onset     Hyperlipidemia Father         Hyperlipidemia,Hyperlipidemia     Kidney Cancer Father      Other - See Comments Mother         GI Disease,Diverticulosis.     Breast Cancer Sister         Cancer-breast,Possible breast cancer     Other - See Comments Brother         Psychiatric illness,Depression, learning disability     Colon Cancer No family hx of      Prostate Cancer No family hx of        Review of Systems  I personally reviewed the ROS with the patient.    Nursing Notes:   Jill Riley LPN  1/2/2020 10:43 AM  Signed  Pt presents to clinic for a 2 month elevated PSA follow up    Review of Systems:    Weight loss:    No     Recent fever/chills:  No   Night sweats:   No  Current skin rash:  No   Recent hair loss:  No  Heat intolerance:  No   Cold intolerance:  No  Chest pain:   No   Palpitations:   No  Shortness of breath:  No   Wheezing:   No  Constipation:    No   Diarrhea:   No   Nausea:   No   Vomiting:   No   Kidney/side pain:  No   Back pain:   No  Frequent headaches:  No   Dizziness:     No  Leg swelling:   No   Calf pain:    No          Physical Exam  Vitals:    01/02/20 1040   BP: 130/80   BP Location: Left arm   Patient Position: Sitting   Cuff Size: Adult Regular   Pulse: 68   Resp: 14   Weight: 93 kg (205 lb)   Constitutional: No acute distress.   Alert and cooperative   Head: NCAT  Eyes: Conjunctivae normal  Cardiovascular: Regular rate.  Pulmonary/Chest: Respirations are even and non-labored bilaterally, no audible wheezing  Abdominal: Soft. No distension, tenderness, masses or guarding.   Neurological: A + O x 3.  Cranial Nerves II-XII grossly intact.  Extremities: EDILIA x 4, Warm. No clubbing.  No cyanosis.    Skin: Pink, warm and dry.  No visible rashes noted.  Psychiatric:  Normal mood and affect  Back:  No left CVA tenderness.  No right CVA tenderness.  Genitourinary:  Nonpalpable bladder    Labs  Results for SANDOVAL PRESCOTT (MRN [id number]) as of 1/2/2020 10:43   10/18/2019 09:47 1/2/2020 08:40   Prostate Specific Antigen 6.006 (H) 3.024     Assessment  Mr. Prescott is a 44 year old male who follows up with a normalizing PSA.  The significant drop we both feel comfortable continuing to follow the value at this point.  Discussed the risks of biopsy leading to overdiagnosis and overtreatment.  We discussed the fact that PSA is an inflammatory marker and often elevated in settings of UTI.    We also discussed age-adjusted normal values.    Plan  Follow up PSA in 6 months

## 2020-01-02 NOTE — NURSING NOTE
Pt presents to clinic for a 2 month elevated PSA follow up    Review of Systems:    Weight loss:    No     Recent fever/chills:  No   Night sweats:   No  Current skin rash:  No   Recent hair loss:  No  Heat intolerance:  No   Cold intolerance:  No  Chest pain:   No   Palpitations:   No  Shortness of breath:  No   Wheezing:   No  Constipation:    No   Diarrhea:   No   Nausea:   No   Vomiting:   No   Kidney/side pain:  No   Back pain:   No  Frequent headaches:  No   Dizziness:     No  Leg swelling:   No   Calf pain:    No

## 2020-02-07 DIAGNOSIS — F42.9 OBSESSIVE-COMPULSIVE DISORDER, UNSPECIFIED TYPE: ICD-10-CM

## 2020-02-07 RX ORDER — PAROXETINE 10 MG/1
10 TABLET, FILM COATED ORAL EVERY MORNING
Qty: 90 TABLET | Refills: 2 | Status: SHIPPED | OUTPATIENT
Start: 2020-02-07 | End: 2020-09-25

## 2020-02-07 NOTE — TELEPHONE ENCOUNTER
"Requested Prescriptions   Pending Prescriptions Disp Refills     PARoxetine (PAXIL) 10 MG tablet [Pharmacy Med Name: PAROXETINE HCL 10 MG TABLET] 30 tablet 11     Sig: TAKE 1 TABLET (10 MG) BY MOUTH EVERY MORNING       SSRIs Protocol Passed - 2/7/2020  2:08 AM        Passed - Recent (12 mo) or future (30 days) visit within the authorizing provider's specialty     Patient has had an office visit with the authorizing provider or a provider within the authorizing providers department within the previous 12 mos or has a future within next 30 days. See \"Patient Info\" tab in inbasket, or \"Choose Columns\" in Meds & Orders section of the refill encounter.              Passed - Medication is active on med list        Passed - Patient is age 18 or older      LOV 10/18/19  Prescription approved per [facility] Refill Protocol.  Brenda J. Goodell, RN on 2/7/2020 at 9:10 AM      "

## 2020-03-11 ENCOUNTER — HEALTH MAINTENANCE LETTER (OUTPATIENT)
Age: 45
End: 2020-03-11

## 2020-03-26 ENCOUNTER — VIRTUAL VISIT (OUTPATIENT)
Dept: UROLOGY | Facility: OTHER | Age: 45
End: 2020-03-26
Attending: UROLOGY
Payer: MEDICAID

## 2020-03-26 VITALS — BODY MASS INDEX: 31.32 KG/M2 | WEIGHT: 200 LBS

## 2020-03-26 DIAGNOSIS — R10.2 PERINEAL PAIN IN MALE: ICD-10-CM

## 2020-03-26 DIAGNOSIS — R97.20 ELEVATED PSA: Primary | ICD-10-CM

## 2020-03-26 PROCEDURE — 99441 ZZC PHYSICIAN TELEPHONE EVALUATION 5-10 MIN: CPT | Performed by: UROLOGY

## 2020-03-26 ASSESSMENT — PAIN SCALES - GENERAL: PAINLEVEL: NO PAIN (0)

## 2020-03-26 NOTE — PROGRESS NOTES
"Pt states that he had a prostate infection about 8 months ago and was treated.  Feels like it has flared up, no pain in urinating, no blood in urine. states it feels warm in the prostate area.   States it started to hurt after intercourse    Bebeto Prescott is a 44 year old male who is being evaluated via a billable telephone visit.      The patient has been notified of following:     \"This telephone visit will be conducted via a call between you and your physician/provider. We have found that certain health care needs can be provided without the need for a physical exam.  This service lets us provide the care you need with a short phone conversation.  If a prescription is necessary we can send it directly to your pharmacy.  If lab work is needed we can place an order for that and you can then stop by our lab to have the test done at a later time.    If during the course of the call the physician/provider feels a telephone visit is not appropriate, you will not be charged for this service.\"     Patient has given verbal consent for Telephone visit?  Yes    Bebeto Prescott complains of    Chief Complaint   Patient presents with     prostate issues     I have reviewed and updated the patient's Past Medical History, Social History, Family History and Medication List.    ALLERGIES  Mold    Labs  Results for BEBETO PRESCOTT (MRN [id number]) as of 1/2/2020 10:43   10/18/2019 09:47 1/2/2020 08:40   Prostate Specific Antigen 6.006 (H) 3.024     Notes and Assessment  Mr. Prescott is a 44 year old male with a h/o elevated PSA who called the clinic with concerns regarding a recent episode of intercourse.  The patient has no issues with erectile dysfunction.  During intercourse the patient prematurely discontinued and did not ejaculate.  Since that time he has noticed a fullness or swelling of the perineum/prostate area.  He denies dysuria, new urinary symptoms, gross hematuria or pelvic pain.    Plan  Follow up PSA this July as " planned  I reassured the patient that no additional work-up or testing is needed and that his symptoms should slowly improve over the next few weeks.  If his symptoms worsen I asked him to call the clinic.      Maximo Kendall MD      Phone call duration: 6 minutes

## 2020-06-16 DIAGNOSIS — R97.20 ELEVATED PSA: Primary | ICD-10-CM

## 2020-06-16 NOTE — PROGRESS NOTES
3/26/20  Plan  Follow up PSA this July as planned  I reassured the patient that no additional work-up or testing is needed and that his symptoms should slowly improve over the next few weeks.  If his symptoms worsen I asked him to call the clinic.

## 2020-07-01 ENCOUNTER — OFFICE VISIT (OUTPATIENT)
Dept: UROLOGY | Facility: OTHER | Age: 45
End: 2020-07-01
Attending: UROLOGY
Payer: COMMERCIAL

## 2020-07-01 VITALS
HEART RATE: 64 BPM | WEIGHT: 201.6 LBS | SYSTOLIC BLOOD PRESSURE: 115 MMHG | DIASTOLIC BLOOD PRESSURE: 80 MMHG | RESPIRATION RATE: 16 BRPM | BODY MASS INDEX: 31.58 KG/M2

## 2020-07-01 DIAGNOSIS — R97.20 ELEVATED PSA: Primary | ICD-10-CM

## 2020-07-01 DIAGNOSIS — R97.20 ELEVATED PSA: ICD-10-CM

## 2020-07-01 LAB — PSA SERPL-MCNC: 1.86 NG/ML

## 2020-07-01 PROCEDURE — 99213 OFFICE O/P EST LOW 20 MIN: CPT | Performed by: UROLOGY

## 2020-07-01 PROCEDURE — 36415 COLL VENOUS BLD VENIPUNCTURE: CPT | Mod: ZL | Performed by: UROLOGY

## 2020-07-01 PROCEDURE — G0463 HOSPITAL OUTPT CLINIC VISIT: HCPCS

## 2020-07-01 PROCEDURE — 84153 ASSAY OF PSA TOTAL: CPT | Mod: ZL | Performed by: UROLOGY

## 2020-07-01 ASSESSMENT — PAIN SCALES - GENERAL: PAINLEVEL: NO PAIN (0)

## 2020-07-01 NOTE — NURSING NOTE
"Patient presents for 6 month PSA follow up.     Chief Complaint   Patient presents with     Follow Up     PSA 6 month        Initial /80 (BP Location: Right arm, Patient Position: Sitting, Cuff Size: Adult Regular)   Pulse 64   Resp 16   Wt 91.4 kg (201 lb 9.6 oz)   BMI 31.58 kg/m   Estimated body mass index is 31.58 kg/m  as calculated from the following:    Height as of 6/7/19: 1.702 m (5' 7\").    Weight as of this encounter: 91.4 kg (201 lb 9.6 oz).  Medication Reconciliation: complete     Review of Systems:    Weight loss:    No     Recent fever/chills:  No   Night sweats:   No  Current skin rash:  No   Recent hair loss:  No  Heat intolerance:  No   Cold intolerance:  No  Chest pain:   No   Palpitations:   No  Shortness of breath:  No   Wheezing:   No  Constipation:    No   Diarrhea:   No   Nausea:   No   Vomiting:   No   Kidney/side pain:  No   Back pain:   No  Frequent headaches:  No   Dizziness:     No  Leg swelling:   No   Calf pain:    No    Parents, brothers or sisters with history of kidney cancer:   No  Parents, brothers or sisters with history of bladder cancer: No      Umu Mera RN    "

## 2020-07-01 NOTE — PROGRESS NOTES
"Type of Visit  EST    Chief Complaint  Elevated PSA    HPI  Mr. Prescott is a 45 year old male who follows up with an elevated PSA collected at the time of a diagnosed UTI approximately 9 months ago.  He did undergo a follow-up PSA showing a decreasing trend with a PSA.  He denies any new symptoms since last visit.  He does not have a family history of prostate cancer.  The patient has not previously undergone prostate biopsy.  No other complaints.      Review of Systems  I personally reviewed the ROS with the patient.    Nursing Notes:   Umu Mera RN  7/1/2020 12:02 PM  Signed  Patient presents for 6 month PSA follow up.     Chief Complaint   Patient presents with     Follow Up     PSA 6 month        Initial /80 (BP Location: Right arm, Patient Position: Sitting, Cuff Size: Adult Regular)   Pulse 64   Resp 16   Wt 91.4 kg (201 lb 9.6 oz)   BMI 31.58 kg/m   Estimated body mass index is 31.58 kg/m  as calculated from the following:    Height as of 6/7/19: 1.702 m (5' 7\").    Weight as of this encounter: 91.4 kg (201 lb 9.6 oz).  Medication Reconciliation: complete     Review of Systems:    Weight loss:    No     Recent fever/chills:  No   Night sweats:   No  Current skin rash:  No   Recent hair loss:  No  Heat intolerance:  No   Cold intolerance:  No  Chest pain:   No   Palpitations:   No  Shortness of breath:  No   Wheezing:   No  Constipation:    No   Diarrhea:   No   Nausea:   No   Vomiting:   No   Kidney/side pain:  No   Back pain:   No  Frequent headaches:  No   Dizziness:     No  Leg swelling:   No   Calf pain:    No    Parents, brothers or sisters with history of kidney cancer:   No  Parents, brothers or sisters with history of bladder cancer: No      Umu Mera RN      Physical Exam  Vitals:    07/01/20 1158   BP: 115/80   BP Location: Right arm   Patient Position: Sitting   Cuff Size: Adult Regular   Pulse: 64   Resp: 16   Weight: 91.4 kg (201 lb 9.6 oz)    Constitutional: No acute " distress.  Alert and cooperative   Head: NCAT  Eyes: Conjunctivae normal  Cardiovascular: Regular rate.  Pulmonary/Chest: Respirations are even and non-labored bilaterally, no audible wheezing  Abdominal: Soft. No distension, tenderness, masses or guarding.   Neurological: A + O x 3.  Cranial Nerves II-XII grossly intact.  Extremities: EDILIA x 4, Warm. No clubbing.  No cyanosis.    Skin: Pink, warm and dry.  No visible rashes noted.  Psychiatric:  Normal mood and affect  Back:  No left CVA tenderness.  No right CVA tenderness.  Genitourinary:  Nonpalpable bladder    Labs  Results for SANDOVAL PRESCOTT (MRN [id number]) as of 7/1/2020 11:54   10/18/2019 09:47 1/2/2020 08:40 7/1/2020 07:21   Prostate Specific Antigen 6.006 (H) 3.024 1.856     Assessment  Mr. Prescott is a 45 year old male who follows up with a normalized PSA.  His PSA has returned back to a normal baseline level for age.  He would like to transition back to his PCP for ongoing annual care.  If his PSA is abnormal in the future he plans to see me back at that time.    Plan  Follow up as needed

## 2020-09-23 DIAGNOSIS — F42.9 OBSESSIVE-COMPULSIVE DISORDER, UNSPECIFIED TYPE: ICD-10-CM

## 2020-09-25 RX ORDER — PAROXETINE 10 MG/1
TABLET, FILM COATED ORAL
Qty: 90 TABLET | Refills: 2 | Status: SHIPPED | OUTPATIENT
Start: 2020-09-25 | End: 2020-11-05

## 2020-09-25 NOTE — TELEPHONE ENCOUNTER
[facility] sent Rx request for the following:   PARoxetine (PAXIL) 10 MG tablet   Sig: TAKE 1 TABLET (10 MG) BY MOUTH EVERY MORNING - Oral    Last Prescription Date:   02/07/2020  Last Fill Qty/Refills:         90, R-2    Last Office Visit:              10/18/2019   Future Office visit:           none      Prescription refilled per RN Medication Refill Policy.................... Sonam Rodriguez RN ....................  9/25/2020   3:03 PM

## 2020-10-24 ENCOUNTER — ALLIED HEALTH/NURSE VISIT (OUTPATIENT)
Dept: FAMILY MEDICINE | Facility: OTHER | Age: 45
End: 2020-10-24
Attending: INTERNAL MEDICINE
Payer: COMMERCIAL

## 2020-10-24 DIAGNOSIS — M79.10 MUSCLE PAIN: Primary | ICD-10-CM

## 2020-10-24 PROCEDURE — 99207 PR NO CHARGE NURSE ONLY: CPT

## 2020-10-24 PROCEDURE — C9803 HOPD COVID-19 SPEC COLLECT: HCPCS

## 2020-10-24 PROCEDURE — U0003 INFECTIOUS AGENT DETECTION BY NUCLEIC ACID (DNA OR RNA); SEVERE ACUTE RESPIRATORY SYNDROME CORONAVIRUS 2 (SARS-COV-2) (CORONAVIRUS DISEASE [COVID-19]), AMPLIFIED PROBE TECHNIQUE, MAKING USE OF HIGH THROUGHPUT TECHNOLOGIES AS DESCRIBED BY CMS-2020-01-R: HCPCS | Mod: ZL | Performed by: INTERNAL MEDICINE

## 2020-10-26 LAB
SARS-COV-2 RNA SPEC QL NAA+PROBE: ABNORMAL
SPECIMEN SOURCE: ABNORMAL

## 2020-10-27 ENCOUNTER — TELEPHONE (OUTPATIENT)
Dept: LAB | Facility: HOSPITAL | Age: 45
End: 2020-10-27

## 2020-10-27 NOTE — TELEPHONE ENCOUNTER
Coronavirus (COVID-19) Notification    Reason for call  Notify of POSITIVE  COVID-19 lab result, assess symptoms,  review [facility] recommendations    Lab Result   Lab test for 2019-nCoV rRt-PCR or SARS-COV-2 PCR  Oropharyngeal AND/OR nasopharyngeal swabs were POSITIVE for 2019-nCoV RNA [OR] SARS-COV-2 RNA (COVID-19) RNA     We have been unable to reach Patient by phone at this time to notify of their Positive COVID-19 result.  Left voicemail message requesting a call back to [phone] [facility] for results.        POSITIVE COVID-19 Letter sent.    [Name]  Ruby Rowe RN

## 2020-11-05 ENCOUNTER — OFFICE VISIT (OUTPATIENT)
Dept: PEDIATRICS | Facility: OTHER | Age: 45
End: 2020-11-05
Attending: INTERNAL MEDICINE
Payer: COMMERCIAL

## 2020-11-05 VITALS
OXYGEN SATURATION: 97 % | HEIGHT: 67 IN | SYSTOLIC BLOOD PRESSURE: 114 MMHG | HEART RATE: 65 BPM | RESPIRATION RATE: 16 BRPM | DIASTOLIC BLOOD PRESSURE: 70 MMHG | BODY MASS INDEX: 32.18 KG/M2 | WEIGHT: 205 LBS | TEMPERATURE: 98.4 F

## 2020-11-05 DIAGNOSIS — Z13.29 SCREENING FOR THYROID DISORDER: ICD-10-CM

## 2020-11-05 DIAGNOSIS — Z13.0 SCREENING, ANEMIA, DEFICIENCY, IRON: ICD-10-CM

## 2020-11-05 DIAGNOSIS — F42.9 OBSESSIVE-COMPULSIVE DISORDER, UNSPECIFIED TYPE: Primary | ICD-10-CM

## 2020-11-05 DIAGNOSIS — Z13.1 SCREENING FOR DIABETES MELLITUS: ICD-10-CM

## 2020-11-05 DIAGNOSIS — L03.116 CELLULITIS OF LEFT LOWER EXTREMITY: ICD-10-CM

## 2020-11-05 DIAGNOSIS — Z13.220 LIPID SCREENING: ICD-10-CM

## 2020-11-05 DIAGNOSIS — U07.1 COVID-19: ICD-10-CM

## 2020-11-05 LAB
ANION GAP SERPL CALCULATED.3IONS-SCNC: 8 MMOL/L (ref 3–14)
BUN SERPL-MCNC: 17 MG/DL (ref 7–25)
CALCIUM SERPL-MCNC: 9.5 MG/DL (ref 8.6–10.3)
CHLORIDE SERPL-SCNC: 98 MMOL/L (ref 98–107)
CHOLEST SERPL-MCNC: 196 MG/DL
CO2 SERPL-SCNC: 30 MMOL/L (ref 21–31)
CREAT SERPL-MCNC: 0.94 MG/DL (ref 0.7–1.3)
ERYTHROCYTE [DISTWIDTH] IN BLOOD BY AUTOMATED COUNT: 12.3 % (ref 10–15)
GFR SERPL CREATININE-BSD FRML MDRD: 86 ML/MIN/{1.73_M2}
GLUCOSE SERPL-MCNC: 90 MG/DL (ref 70–105)
HBA1C MFR BLD: 4.8 % (ref 4–6)
HCT VFR BLD AUTO: 44.4 % (ref 40–53)
HDLC SERPL-MCNC: 33 MG/DL (ref 23–92)
HGB BLD-MCNC: 15.3 G/DL (ref 13.3–17.7)
LDLC SERPL CALC-MCNC: 137 MG/DL
MCH RBC QN AUTO: 30.2 PG (ref 26.5–33)
MCHC RBC AUTO-ENTMCNC: 34.5 G/DL (ref 31.5–36.5)
MCV RBC AUTO: 88 FL (ref 78–100)
NONHDLC SERPL-MCNC: 163 MG/DL
PLATELET # BLD AUTO: 236 10E9/L (ref 150–450)
POTASSIUM SERPL-SCNC: 4.3 MMOL/L (ref 3.5–5.1)
RBC # BLD AUTO: 5.06 10E12/L (ref 4.4–5.9)
SODIUM SERPL-SCNC: 136 MMOL/L (ref 134–144)
TRIGL SERPL-MCNC: 132 MG/DL
TSH SERPL DL<=0.05 MIU/L-ACNC: 2.15 IU/ML (ref 0.34–5.6)
WBC # BLD AUTO: 9.9 10E9/L (ref 4–11)

## 2020-11-05 PROCEDURE — 36415 COLL VENOUS BLD VENIPUNCTURE: CPT | Mod: ZL | Performed by: INTERNAL MEDICINE

## 2020-11-05 PROCEDURE — 99214 OFFICE O/P EST MOD 30 MIN: CPT | Performed by: INTERNAL MEDICINE

## 2020-11-05 PROCEDURE — 83036 HEMOGLOBIN GLYCOSYLATED A1C: CPT | Mod: ZL | Performed by: INTERNAL MEDICINE

## 2020-11-05 PROCEDURE — G0463 HOSPITAL OUTPT CLINIC VISIT: HCPCS

## 2020-11-05 PROCEDURE — 80061 LIPID PANEL: CPT | Mod: ZL | Performed by: INTERNAL MEDICINE

## 2020-11-05 PROCEDURE — 84443 ASSAY THYROID STIM HORMONE: CPT | Mod: ZL | Performed by: INTERNAL MEDICINE

## 2020-11-05 PROCEDURE — 80048 BASIC METABOLIC PNL TOTAL CA: CPT | Mod: ZL | Performed by: INTERNAL MEDICINE

## 2020-11-05 PROCEDURE — 85027 COMPLETE CBC AUTOMATED: CPT | Mod: ZL | Performed by: INTERNAL MEDICINE

## 2020-11-05 RX ORDER — MUPIROCIN 20 MG/G
OINTMENT TOPICAL 2 TIMES DAILY
Qty: 30 G | Refills: 3 | Status: SHIPPED | OUTPATIENT
Start: 2020-11-05 | End: 2021-01-20

## 2020-11-05 RX ORDER — PAROXETINE 10 MG/1
10 TABLET, FILM COATED ORAL EVERY MORNING
Qty: 90 TABLET | Refills: 3 | Status: SHIPPED | OUTPATIENT
Start: 2020-11-05 | End: 2021-12-20

## 2020-11-05 RX ORDER — SULFAMETHOXAZOLE/TRIMETHOPRIM 800-160 MG
1 TABLET ORAL 2 TIMES DAILY
Qty: 20 TABLET | Refills: 0 | Status: SHIPPED | OUTPATIENT
Start: 2020-11-05 | End: 2020-11-15

## 2020-11-05 ASSESSMENT — ANXIETY QUESTIONNAIRES
5. BEING SO RESTLESS THAT IT IS HARD TO SIT STILL: NOT AT ALL
IF YOU CHECKED OFF ANY PROBLEMS ON THIS QUESTIONNAIRE, HOW DIFFICULT HAVE THESE PROBLEMS MADE IT FOR YOU TO DO YOUR WORK, TAKE CARE OF THINGS AT HOME, OR GET ALONG WITH OTHER PEOPLE: NOT DIFFICULT AT ALL
3. WORRYING TOO MUCH ABOUT DIFFERENT THINGS: NOT AT ALL
GAD7 TOTAL SCORE: 0
2. NOT BEING ABLE TO STOP OR CONTROL WORRYING: NOT AT ALL
6. BECOMING EASILY ANNOYED OR IRRITABLE: NOT AT ALL
7. FEELING AFRAID AS IF SOMETHING AWFUL MIGHT HAPPEN: NOT AT ALL
1. FEELING NERVOUS, ANXIOUS, OR ON EDGE: NOT AT ALL

## 2020-11-05 ASSESSMENT — PATIENT HEALTH QUESTIONNAIRE - PHQ9
SUM OF ALL RESPONSES TO PHQ QUESTIONS 1-9: 4
5. POOR APPETITE OR OVEREATING: NOT AT ALL

## 2020-11-05 ASSESSMENT — MIFFLIN-ST. JEOR: SCORE: 1772.46

## 2020-11-05 ASSESSMENT — PAIN SCALES - GENERAL: PAINLEVEL: SEVERE PAIN (7)

## 2020-11-05 NOTE — PROGRESS NOTES
Subjective  Bebeto Prescott is a 45 year old male who presents for medication management.  He has a history of obsessive-compulsive disorder which is stable on paroxetine.  He feels like it is working well for him.  No changes requested.  Recently he developed aching, tired and loss of smell and taste.  He tested positive for COVID-19.  He feels like he had mild symptoms and they have resolved.  His wife and stepchild were also positive.  He thinks he contracted COVID-19 at a .  Subsequently he developed a boil on his leg.  He wants me to take a look at it today.  Its been there for couple of days.  He has had very dry skin recently.  He is trying to moisturize.  He wonders if something may be predisposing him to infection.    Problem List/PMH: reviewed in EMR, and made relevant updates today.  Medications: reviewed in EMR, and made relevant updates today.  Allergies: reviewed in EMR, and made relevant updates today.    Social Hx:  Social History     Tobacco Use     Smoking status: Never Smoker     Smokeless tobacco: Never Used   Substance Use Topics     Alcohol use: No     Alcohol/week: 0.0 standard drinks     Drug use: No     Social History     Social History Narrative    Single.  Recently moved to [place] to  a new [employer].     I reviewed social history and made relevant updates today.    Family Hx:   Family History   Problem Relation Age of Onset     Hyperlipidemia Father         Hyperlipidemia,Hyperlipidemia     Kidney Cancer Father      Other - See Comments Mother         GI Disease,Diverticulosis.     Breast Cancer Sister         Cancer-breast,Possible breast cancer     Other - See Comments Brother         Psychiatric illness,Depression, learning disability     Colon Cancer No family hx of      Prostate Cancer No family hx of        Objective  Vitals: reviewed in EMR.  /70 (BP Location: Right arm, Patient Position: Sitting, Cuff Size: Adult Large)   Pulse 65   Temp 98.4  F (36.9  C)  "(Tympanic)   Resp 16   Ht 1.708 m (5' 7.25\")   Wt 93 kg (205 lb)   SpO2 97%   BMI 31.87 kg/m      Gen: Pleasant male, NAD.  HEENT: MMM, no OP erythema.   Neck: Supple, no JVD, no bruits.  CV: RRR no m/r/g.   Pulm: CTAB no w/r/r  Neuro: Grossly intact  Msk: No lower extremity edema.  Skin: Area of induration on the left proximal anterior shin, see photo below.  Psychiatric: Normal affect and insight. Does not appear anxious or depressed.      Labs:  Lab Results   Component Value Date    WBC 6.3 10/18/2019    HGB 15.1 10/18/2019    HCT 44.0 10/18/2019     10/18/2019    TRIG 117 10/09/2012    HDL 39 (L) 10/09/2012    ALT 26 06/01/2019    AST 21 06/01/2019     10/18/2019    BUN 15 10/18/2019    CO2 30 10/18/2019       Glucose   Date Value Ref Range Status   10/18/2019 99 70 - 105 mg/dL Final   06/01/2019 95 70 - 105 mg/dL Final     No results found for: A1C  Creatinine   Date Value Ref Range Status   10/18/2019 1.06 0.70 - 1.30 mg/dL Final   06/01/2019 1.07 0.70 - 1.30 mg/dL Final     LDL Cholesterol Calculated   Date Value Ref Range Status   10/09/2012 147 (H) <=130 mg/dL      No results found for: [facility]            Assessment    ICD-10-CM    1. Obsessive-compulsive disorder, unspecified type  F42.9 PARoxetine (PAXIL) 10 MG tablet   2. COVID-19  U07.1    3. Cellulitis of left lower extremity  L03.116 mupirocin (BACTROBAN) 2 % external ointment     sulfamethoxazole-trimethoprim (BACTRIM DS) 800-160 MG tablet   4. Screening for diabetes mellitus  Z13.1 Basic metabolic panel     Hemoglobin A1c     Basic metabolic panel     Hemoglobin A1c   5. Screening for thyroid disorder  Z13.29 Thyrotropin GH     Thyrotropin GH   6. Screening, anemia, deficiency, iron  Z13.0 CBC with platelets     CBC with platelets   7. Lipid screening  Z13.220 Lipid Profile     Lipid Profile     Orders Placed This Encounter   Procedures     Basic metabolic panel     Hemoglobin A1c     Thyrotropin GH     CBC with platelets     Lipid " Profile       He seems to have weathered the COVID-19 infection very well.  The area of infection on the leg appears more consistent with cellulitis to me.  I feel no fluctuance, rather induration.  Warning signs discussed and repeat evaluation recommended if symptoms persist or worsen.    Plan   -- Expected clinical course discussed   -- Medications and their side effects discussed  Patient Instructions    -- Bactrim x 10 days   -- Eat yogurt 1-2 times per day while on antibiotics (and for a few weeks after) to reduce the chances of diarrhea   -- Topical mupirocin antibiotic   -- If not resolved by days 8-10, return for recheck      Signed, Bebeto Feldman MD, FAAP, FACP  Internal Medicine & Pediatrics

## 2020-11-05 NOTE — NURSING NOTE
"Chief Complaint   Patient presents with     Recheck Medication     Derm Problem     Boil in left lower leg      Patient is here for a follow up on Paxil. He also has a boil on left lower leg.    Initial /70 (BP Location: Right arm, Patient Position: Sitting, Cuff Size: Adult Large)   Pulse 65   Temp 98.4  F (36.9  C) (Tympanic)   Resp 16   Ht 1.708 m (5' 7.25\")   Wt 93 kg (205 lb)   SpO2 97%   BMI 31.87 kg/m   Estimated body mass index is 31.87 kg/m  as calculated from the following:    Height as of this encounter: 1.708 m (5' 7.25\").    Weight as of this encounter: 93 kg (205 lb).  Medication Reconciliation: complete    Angie Jarrett MA  "

## 2020-11-05 NOTE — PATIENT INSTRUCTIONS
-- Bactrim x 10 days   -- Eat yogurt 1-2 times per day while on antibiotics (and for a few weeks after) to reduce the chances of diarrhea   -- Topical mupirocin antibiotic   -- If not resolved by days 8-10, return for recheck

## 2020-11-06 ASSESSMENT — ANXIETY QUESTIONNAIRES: GAD7 TOTAL SCORE: 0

## 2020-11-07 ENCOUNTER — APPOINTMENT (OUTPATIENT)
Dept: GENERAL RADIOLOGY | Facility: OTHER | Age: 45
End: 2020-11-07
Attending: STUDENT IN AN ORGANIZED HEALTH CARE EDUCATION/TRAINING PROGRAM
Payer: COMMERCIAL

## 2020-11-07 ENCOUNTER — HOSPITAL ENCOUNTER (EMERGENCY)
Facility: OTHER | Age: 45
Discharge: HOME | End: 2020-11-07
Attending: STUDENT IN AN ORGANIZED HEALTH CARE EDUCATION/TRAINING PROGRAM | Admitting: STUDENT IN AN ORGANIZED HEALTH CARE EDUCATION/TRAINING PROGRAM
Payer: COMMERCIAL

## 2020-11-07 VITALS
RESPIRATION RATE: 20 BRPM | HEIGHT: 67 IN | DIASTOLIC BLOOD PRESSURE: 100 MMHG | HEART RATE: 87 BPM | SYSTOLIC BLOOD PRESSURE: 143 MMHG | TEMPERATURE: 99.2 F | BODY MASS INDEX: 32.18 KG/M2 | WEIGHT: 205 LBS | OXYGEN SATURATION: 96 %

## 2020-11-07 DIAGNOSIS — L03.116 CELLULITIS OF LEFT LOWER EXTREMITY: ICD-10-CM

## 2020-11-07 LAB
BASOPHILS # BLD AUTO: 0.1 10E9/L (ref 0–0.2)
BASOPHILS NFR BLD AUTO: 0.5 %
CRP SERPL-MCNC: 29.2 MG/L
DIFFERENTIAL METHOD BLD: NORMAL
EOSINOPHIL # BLD AUTO: 0.2 10E9/L (ref 0–0.7)
EOSINOPHIL NFR BLD AUTO: 1.9 %
ERYTHROCYTE [DISTWIDTH] IN BLOOD BY AUTOMATED COUNT: 12.1 % (ref 10–15)
HCT VFR BLD AUTO: 41 % (ref 40–53)
HGB BLD-MCNC: 14.1 G/DL (ref 13.3–17.7)
IMM GRANULOCYTES # BLD: 0 10E9/L (ref 0–0.4)
IMM GRANULOCYTES NFR BLD: 0.4 %
LYMPHOCYTES # BLD AUTO: 2.1 10E9/L (ref 0.8–5.3)
LYMPHOCYTES NFR BLD AUTO: 18.9 %
MCH RBC QN AUTO: 30.2 PG (ref 26.5–33)
MCHC RBC AUTO-ENTMCNC: 34.4 G/DL (ref 31.5–36.5)
MCV RBC AUTO: 88 FL (ref 78–100)
MONOCYTES # BLD AUTO: 1 10E9/L (ref 0–1.3)
MONOCYTES NFR BLD AUTO: 9.1 %
NEUTROPHILS # BLD AUTO: 7.6 10E9/L (ref 1.6–8.3)
NEUTROPHILS NFR BLD AUTO: 69.2 %
PLATELET # BLD AUTO: 259 10E9/L (ref 150–450)
RBC # BLD AUTO: 4.67 10E12/L (ref 4.4–5.9)
WBC # BLD AUTO: 11 10E9/L (ref 4–11)

## 2020-11-07 PROCEDURE — 99283 EMERGENCY DEPT VISIT LOW MDM: CPT | Mod: 25 | Performed by: STUDENT IN AN ORGANIZED HEALTH CARE EDUCATION/TRAINING PROGRAM

## 2020-11-07 PROCEDURE — 85025 COMPLETE CBC W/AUTO DIFF WBC: CPT | Performed by: STUDENT IN AN ORGANIZED HEALTH CARE EDUCATION/TRAINING PROGRAM

## 2020-11-07 PROCEDURE — 86140 C-REACTIVE PROTEIN: CPT | Performed by: STUDENT IN AN ORGANIZED HEALTH CARE EDUCATION/TRAINING PROGRAM

## 2020-11-07 PROCEDURE — 73590 X-RAY EXAM OF LOWER LEG: CPT | Mod: TC,LT

## 2020-11-07 PROCEDURE — 99284 EMERGENCY DEPT VISIT MOD MDM: CPT | Mod: 25 | Performed by: STUDENT IN AN ORGANIZED HEALTH CARE EDUCATION/TRAINING PROGRAM

## 2020-11-07 PROCEDURE — 76882 US LMTD JT/FCL EVL NVASC XTR: CPT | Mod: TC | Performed by: STUDENT IN AN ORGANIZED HEALTH CARE EDUCATION/TRAINING PROGRAM

## 2020-11-07 PROCEDURE — 76882 US LMTD JT/FCL EVL NVASC XTR: CPT | Mod: 26 | Performed by: STUDENT IN AN ORGANIZED HEALTH CARE EDUCATION/TRAINING PROGRAM

## 2020-11-07 PROCEDURE — 250N000011 HC RX IP 250 OP 636: Performed by: STUDENT IN AN ORGANIZED HEALTH CARE EDUCATION/TRAINING PROGRAM

## 2020-11-07 PROCEDURE — 96374 THER/PROPH/DIAG INJ IV PUSH: CPT | Performed by: STUDENT IN AN ORGANIZED HEALTH CARE EDUCATION/TRAINING PROGRAM

## 2020-11-07 PROCEDURE — 36415 COLL VENOUS BLD VENIPUNCTURE: CPT | Performed by: STUDENT IN AN ORGANIZED HEALTH CARE EDUCATION/TRAINING PROGRAM

## 2020-11-07 RX ORDER — CEFAZOLIN SODIUM 2 G/100ML
2 INJECTION, SOLUTION INTRAVENOUS ONCE
Status: COMPLETED | OUTPATIENT
Start: 2020-11-07 | End: 2020-11-07

## 2020-11-07 RX ORDER — CEPHALEXIN 500 MG/1
500 CAPSULE ORAL 4 TIMES DAILY
Qty: 28 CAPSULE | Refills: 0 | Status: SHIPPED | OUTPATIENT
Start: 2020-11-07 | End: 2020-11-14

## 2020-11-07 RX ADMIN — CEFAZOLIN SODIUM 2 G: 2 INJECTION, SOLUTION INTRAVENOUS at 22:51

## 2020-11-07 ASSESSMENT — MIFFLIN-ST. JEOR: SCORE: 1768.5

## 2020-11-07 NOTE — ED AVS SNAPSHOT
[facility]  [address]  [place] [address]  Phone: [phone]  Fax: [phone]                                    Bebeto Prescott   MRN: [id number]    Department: [facility]   Date of Visit: 11/7/2020           After Visit Summary Signature Page    I have received my discharge instructions, and my questions have been answered. I have discussed any challenges I see with this plan with the nurse or doctor.    ..........................................................................................................................................  Patient/Patient Representative Signature      ..........................................................................................................................................  Patient Representative Print Name and Relationship to Patient    ..................................................               ................................................  Date                                   Time    ..........................................................................................................................................  Reviewed by Signature/Title    ...................................................              ..............................................  Date                                               Time          22EPIC Rev 08/18

## 2020-11-08 NOTE — DISCHARGE INSTRUCTIONS
As discussed, you had an overall reassuring workup regarding your labs and imaging. Let's add keflex antibiotic to your bactrim. Take this medication for 7 days. If no improvement by Monday evening - please return for evaluation.

## 2020-11-08 NOTE — ED NOTES
Patient discharged to home. New medication education given. Verbalized understanding of all instructions.

## 2020-11-08 NOTE — ED PROVIDER NOTES
History     Chief Complaint   Patient presents with     Wound Infection     L shin       HPI   Bebeto Prescott is a 45 year old male who presents with skin wound.  Seen 2 days ago in clinic and prescribed Bactrim presumed cellulitis of his left anterior lower leg.  Patient has been on Bactrim for over 48 hours now with worsening wound infection status with increased erythema and he also reports he sounds good discomfort. Over past day some white milky discharge he occurred. Reports some general achiness and fatigue over the past day, however this may be due to his recovering Covid status.  No other recent antibiotic use, known antibiotic allergies.  Denies fever, chills, nausea, vomiting, MRSA history.      Allergies   Allergen Reactions     Mold Other (See Comments)     Moldy leaves: sneezing, runny nose, cough     Trichophyton Other (See Comments)     Moldy leaves: sneezing, runny nose, cough       Patient Active Problem List    Diagnosis Date Noted     Allergic state 01/24/2018     Priority: Medium     Achilles tendonitis, bilateral 01/02/2018     Priority: Medium     Acute prostatitis 02/24/2017     Priority: Medium     Right carpal tunnel syndrome 11/17/2016     Priority: Medium     Radial collateral ligament sprain 10/20/2016     Priority: Medium     Aphthous ulcer 12/20/2011     Priority: Medium     Obsessive-compulsive disorder 06/22/2011     Priority: Medium     Overview:   History of compulsive behaviors, improved on Paxil.       Contact dermatitis and other eczema due to plants (except food) 06/18/2010     Priority: Medium       Past Medical History:   Diagnosis Date     Carpal tunnel syndrome of right wrist      Radial collateral ligament sprain of elbow        Past Surgical History:   Procedure Laterality Date     COLONOSCOPY      2006,Normal,  Follow-up recommended at age 49.     OTHER SURGICAL HISTORY      1999,21433.0,CO EXCIS SPERMATOCELE,excision of spermatocele and hydrocele, [Other]  "      Family History   Problem Relation Age of Onset     Hyperlipidemia Father         Hyperlipidemia,Hyperlipidemia     Kidney Cancer Father      Other - See Comments Mother         GI Disease,Diverticulosis.     Breast Cancer Sister         Cancer-breast,Possible breast cancer     Other - See Comments Brother         Psychiatric illness,Depression, learning disability     Colon Cancer No family hx of      Prostate Cancer No family hx of        Social History     Tobacco Use     Smoking status: Never Smoker     Smokeless tobacco: Never Used   Substance Use Topics     Alcohol use: No     Alcohol/week: 0.0 standard drinks     Drug use: No       Medications:         cephALEXin (KEFLEX) 500 MG capsule       mupirocin (BACTROBAN) 2 % external ointment       PARoxetine (PAXIL) 10 MG tablet       sulfamethoxazole-trimethoprim (BACTRIM DS) 800-160 MG tablet        ROS: See HPI for pertinent positives and negatives.     Physical Exam   BP (!) 143/100   Pulse 87   Temp 99.2  F (37.3  C) (Tympanic)   Resp 20   Ht 1.702 m (5' 7\")   Wt 93 kg (205 lb)   SpO2 96%   BMI 32.11 kg/m       Physical Exam  Constitutional:       General: He is not in acute distress.     Appearance: Normal appearance. He is not ill-appearing or toxic-appearing.   HENT:      Head: Normocephalic and atraumatic.      Right Ear: External ear normal.      Left Ear: External ear normal.      Nose: Nose normal.   Eyes:      Extraocular Movements: Extraocular movements intact.      Conjunctiva/sclera: Conjunctivae normal.   Pulmonary:      Effort: Pulmonary effort is normal.   Abdominal:      Tenderness: There is no abdominal tenderness.   Musculoskeletal:         General: Swelling present. No deformity.      Comments: Mild swelling within left anterior shin erythematous region   Skin:     Findings: Erythema present.      Comments: Left anterior shin: Increased warmth, erythematous zone approximately 4 cm x 6 cm tall with induration and no fluctuance.  " "There is a small 3 mm central scab   Neurological:      General: No focal deficit present.      Mental Status: He is alert.   Psychiatric:         Mood and Affect: Mood normal.         Behavior: Behavior normal.         ED Course      Results for orders placed or performed during the hospital encounter of 11/07/20 (from the past 24 hour(s))   POC US SOFT TISSUE    Impression    No appreciated pocket of fluid representing abscess.   XR Tibia & Fibula Port Left 2 Views    Narrative    PROCEDURE INFORMATION:   Exam: XR Left Tibia and Fibula   Exam date and time: 11/7/2020 9:02 PM   Age: 45 years old   Clinical indication: Cellulitis; Lower leg; Left; Patient HX: Saw for shin   wound and was started on antibiotics on Thursday. States that the redness and   swelling is expanding, that he feels \"sicker\", wound has a pin-point area with   white and then is red 7cm wide by 6cm high; Additional info: Eval cellulitis   penetration - bony involvement     TECHNIQUE:   Imaging protocol: XR Left tibia and fibula.   Views: 2 views.     COMPARISON:   No relevant prior studies available.     FINDINGS:   Bones/joints: No fractures. No lytic bone lesion. No periosteal reaction.   Normal bone mineralization.   Soft tissues: Small defect in the soft tissues of the anterior shin. No   radiopaque foreign body.       Impression    IMPRESSION:   1. No osseous abnormality of the left tibia or the fibula.   2. Localized soft tissue injury over the anterior shin.     THIS DOCUMENT HAS BEEN ELECTRONICALLY SIGNED BY HASMUKH LAZARO MD   CBC with platelets differential   Result Value Ref Range    WBC 11.0 4.0 - 11.0 10e9/L    RBC Count 4.67 4.4 - 5.9 10e12/L    Hemoglobin 14.1 13.3 - 17.7 g/dL    Hematocrit 41.0 40.0 - 53.0 %    MCV 88 78 - 100 fl    MCH 30.2 26.5 - 33.0 pg    MCHC 34.4 31.5 - 36.5 g/dL    RDW 12.1 10.0 - 15.0 %    Platelet Count 259 150 - 450 10e9/L    Diff Method Automated Method     % Neutrophils 69.2 %    % Lymphocytes 18.9 %    " % Monocytes 9.1 %    % Eosinophils 1.9 %    % Basophils 0.5 %    % Immature Granulocytes 0.4 %    Absolute Neutrophil 7.6 1.6 - 8.3 10e9/L    Absolute Lymphocytes 2.1 0.8 - 5.3 10e9/L    Absolute Monocytes 1.0 0.0 - 1.3 10e9/L    Absolute Eosinophils 0.2 0.0 - 0.7 10e9/L    Absolute Basophils 0.1 0.0 - 0.2 10e9/L    Abs Immature Granulocytes 0.0 0 - 0.4 10e9/L   CRP inflammation   Result Value Ref Range    CRP Inflammation 29.2 (H) <10.0 mg/L       Medications   ceFAZolin (ANCEF) intermittent infusion 2 g in 100 mL dextrose PRE-MIX (2 g Intravenous Given 11/7/20 8350)       Assessments & Plan (with Medical Decision Making)     I have reviewed the nursing notes.    Patient evaluated for worsening cellulitis following > 48 hours of Bactrim treatment.  Afebrile with enlarged erythematous left anterior lower leg region with induration and no fluctuance.  Normal white count and baseline CRP collected at 29.2.  X-ray did not demonstrate any bone involvement.  Point-of-care ultrasound did not demonstrate any abscess that would require drainage.  Appears there may be coverage gap only on Bactrim with no improvement.  The fact that patient is without systemic symptoms or signs is reassuring and I believe warrants broadened outpatient antibiotic treatment.  Given IV cefazolin and added Keflex 7-day prescription.  Patient will now complete his current Bactrim prescription along with this Keflex prescription.  Return precautions provided if no improvement after 48 hours of this dual antibiotic therapy.  Patient agreement with plan and discharged home in stable condition.    I have reviewed the findings, diagnosis, plan and need for any follow up with the patient.    Discharge Medication List as of 11/7/2020 11:40 PM      START taking these medications    Details   cephALEXin (KEFLEX) 500 MG capsule Take 1 capsule (500 mg) by mouth 4 times daily for 7 days, Disp-28 capsule, R-0, E-Prescribe             Final diagnoses:    Cellulitis of left lower extremity       11/7/2020   [facility]     Alton Martin MD  11/08/20 0909

## 2020-11-08 NOTE — ED TRIAGE NOTES
"Saw Dr. Feldman for ibarra wound and was started on antibiotics on Thursday. States that the redness and swelling is expanding, that he feels \"sicker\", Wound has a pin-point area with white and then is red 7cm wide by 6cm high   "

## 2020-12-12 ENCOUNTER — OFFICE VISIT (OUTPATIENT)
Dept: FAMILY MEDICINE | Facility: OTHER | Age: 45
End: 2020-12-12
Attending: INTERNAL MEDICINE
Payer: COMMERCIAL

## 2020-12-12 VITALS
HEART RATE: 96 BPM | OXYGEN SATURATION: 95 % | SYSTOLIC BLOOD PRESSURE: 122 MMHG | BODY MASS INDEX: 32.51 KG/M2 | WEIGHT: 207.6 LBS | RESPIRATION RATE: 16 BRPM | TEMPERATURE: 98.2 F | DIASTOLIC BLOOD PRESSURE: 82 MMHG

## 2020-12-12 DIAGNOSIS — L85.3 DRY SKIN: ICD-10-CM

## 2020-12-12 DIAGNOSIS — L03.115 CELLULITIS OF RIGHT LOWER EXTREMITY: Primary | ICD-10-CM

## 2020-12-12 PROCEDURE — G0463 HOSPITAL OUTPT CLINIC VISIT: HCPCS

## 2020-12-12 PROCEDURE — 99214 OFFICE O/P EST MOD 30 MIN: CPT | Performed by: INTERNAL MEDICINE

## 2020-12-12 RX ORDER — CEPHALEXIN 500 MG/1
500 CAPSULE ORAL 2 TIMES DAILY
Qty: 28 CAPSULE | Refills: 0 | Status: SHIPPED | OUTPATIENT
Start: 2020-12-12 | End: 2020-12-26

## 2020-12-12 RX ORDER — AMMONIUM LACTATE 12 G/100G
LOTION TOPICAL DAILY PRN
Qty: 225 G | Refills: 0 | Status: SHIPPED | OUTPATIENT
Start: 2020-12-12 | End: 2021-01-20

## 2020-12-12 ASSESSMENT — PAIN SCALES - GENERAL: PAINLEVEL: MODERATE PAIN (4)

## 2020-12-12 NOTE — PROGRESS NOTES
Chief Complaint   Patient presents with     Sore     on right leg for 5 days          Subjective:   Mr. Prescott is a 45 year old male seen for the acute concern today of leg rash.  This has been going on for the last 5 days.  He previously had a similar infection back in November which was treated with initially Bactrim however Keflex was added on after lack of improvement 48 hours after antibiotic initiation.    He reports that he has been trying some topical things recommended by his naturopath however they have not been helping.  He has not had issues with this prior however recently had Covid and does not necessarily feel his immune system has returned to normal.    He has been taking regular probiotics 2 tablets daily.  He has however noted some cracking of his hands since taking the antibiotics.  He has been trying some lotions although they are not enough.  He denies any history of psoriasis or eczema.    He  reports that he has never smoked. He has never used smokeless tobacco.    Past medical history reviewed as below:     Past Medical History:   Diagnosis Date     Carpal tunnel syndrome of right wrist     11/17/2016     Radial collateral ligament sprain of elbow     10/20/2016   .      ROS:   Pertinent  ROS was performed and was negative, including for fevers, chills. No other concerns, with exception of HPI above.      Objective:    /82   Pulse 96   Temp 98.2  F (36.8  C) (Temporal)   Resp 16   Wt 94.2 kg (207 lb 9.6 oz)   SpO2 95%   BMI 32.51 kg/m    GEN: Vitals reviewed.  Patient is in no acute distress. Cooperative with exam.  HEENT: Normocephalic atraumatic.  Eyes grossly normal to inspection.  No discharge or erythema, or obvious scleral/conjunctival abnormalities.    LUNGS: No audible wheeze, cough, or visible cyanosis.  No visible retractions or increased work of breathing.    SKIN: Warm and dry to touch.  Visible skin clear other than area on right leg.  Area of erythema with central  punctate lesion on the anterior right calf.  No drainage can be expressed.  Total area is approximately 4 cm x 4 cm however area of induration centrally is approximately 1-1/2 x 2 cm.  Mildly tender to palpation.  Significant cracking and dryness noted on the bilateral palms.  EXT: No clubbing or cyanosis.  No peripheral edema.  NEURO: Cranial nerves grossly intact.  Mentation and speech appropriate for age.  PSYCH: Mentation appears normal, affect normal/bright, judgement and insight intact, normal speech and appearance well-groomed.       Assessment/Plan:   Cellulitis of right lower extremity  Given that his cellulitis/abscess improved last time after starting Keflex this will be sent in however he is to let me or his PCP know in 48 hours if he is not having improvement and we may need to add the combination of Bactrim which helped last time for him.  He was given information on this.  - cephALEXin (KEFLEX) 500 MG capsule  Dispense: 28 capsule; Refill: 0    Dry skin  At this time we will try ammonium lactate lotion.  If he has persistent symptoms may need to try topical steroids.  - ammonium lactate (LAC-HYDRIN) 12 % external lotion  Dispense: 225 g; Refill: 0      - Return/call as needed for follow-up should any new symptoms develop, for worsening of current symptoms or if symptoms do not resolve with above plan.    Return if symptoms worsen or fail to improve.     MITALI MOSES, DO   12/12/2020 5:34 PM    This document was prepared using voice generated softwear. While every attempt was made for accuracy, grammatical errors may exist.

## 2020-12-12 NOTE — NURSING NOTE
"Chief Complaint   Patient presents with     Sore     on right leg for 5 days   He has had a sore on his right leg for 5 days. He stated he had the same thing on his left leg on November 5 th.  Fartun Marcial LPN..................12/12/2020   4:59 PM      Initial /82   Pulse 96   Temp 98.2  F (36.8  C) (Temporal)   Resp 16   Wt 94.2 kg (207 lb 9.6 oz)   SpO2 95%   BMI 32.51 kg/m   Estimated body mass index is 32.51 kg/m  as calculated from the following:    Height as of 11/7/20: 1.702 m (5' 7\").    Weight as of this encounter: 94.2 kg (207 lb 9.6 oz).  Medication Reconciliation: complete    Fartun Marcial LPN  "

## 2020-12-12 NOTE — PATIENT INSTRUCTIONS
Patient Education     Abcess/Cellulitis  An abscess happens when bacteria get trapped under the skin and start to grow. Pus forms inside the abscess as the body responds to the bacteria. An abscess can happen with an insect bite, ingrown hair, blocked oil gland, pimple, cyst, or puncture wound. It is sometimes call a boil.   In the early stages, your wound may be red and tender. For this stage, you may get antibiotics. If the abscess does not get better with antibiotics, it will need to be drained with a small cut.   Home care  These tips will help you care for your abscess at home:    Soak the wound in hot water or apply hot packs (small towel soaked in hot water) to the area for 20 minutes at a time. Do this 3 to 4 times a day, or as instructed. Use a new towel each time. Wash the towels afterward because they may be contaminated with bacteria after use.    Don't cut, squeeze, or pop the boil yourself.    Put antibiotic cream or ointment on the skin 3 to 4 times a day, unless something else was prescribed. Some ointments include an antibiotic plus a pain reliever.    If your healthcare provider prescribed antibiotics, don't stop taking them until you have finished the medicine or you are told to stop.    You may use an over-the-counter pain medicine to control pain, unless another pain medicine was prescribed. Talk with your provider before taking these medicines if you have chronic liver or kidney disease or ever had a stomach ulcer or digestive bleeding.  Follow-up care  Follow up with your healthcare provider, or as advised. Check your wound each day for the signs that the infection may be getting worse (see below).   When to seek medical advice  Get prompt medical attention if any of these occur:    An increase in redness or swelling    Red streaks in the skin leading away from the abscess    An increase in local pain or swelling    Fever of 100.4 F (38 C) or higher, or as directed by your healthcare  provider    Pus or fluid coming from the abscess    Boil returns after getting better  Radha last reviewed this educational content on 8/1/2019 2000-2020 [employer] [address], [place] [address]. All rights reserved. This information is not intended as a substitute for professional medical care. Always follow your healthcare professional's instructions.

## 2021-01-20 ENCOUNTER — OFFICE VISIT (OUTPATIENT)
Dept: INTERNAL MEDICINE | Facility: OTHER | Age: 46
End: 2021-01-20
Attending: NURSE PRACTITIONER
Payer: COMMERCIAL

## 2021-01-20 VITALS
BODY MASS INDEX: 32.86 KG/M2 | TEMPERATURE: 96.6 F | SYSTOLIC BLOOD PRESSURE: 124 MMHG | DIASTOLIC BLOOD PRESSURE: 80 MMHG | HEART RATE: 59 BPM | RESPIRATION RATE: 16 BRPM | OXYGEN SATURATION: 98 % | WEIGHT: 209.8 LBS

## 2021-01-20 DIAGNOSIS — L03.012 CELLULITIS OF FINGER OF LEFT HAND: Primary | ICD-10-CM

## 2021-01-20 DIAGNOSIS — R21 RASH AND NONSPECIFIC SKIN ERUPTION: ICD-10-CM

## 2021-01-20 PROCEDURE — 87077 CULTURE AEROBIC IDENTIFY: CPT | Mod: ZL | Performed by: NURSE PRACTITIONER

## 2021-01-20 PROCEDURE — 87070 CULTURE OTHR SPECIMN AEROBIC: CPT | Mod: ZL | Performed by: NURSE PRACTITIONER

## 2021-01-20 PROCEDURE — G0463 HOSPITAL OUTPT CLINIC VISIT: HCPCS

## 2021-01-20 PROCEDURE — G0463 HOSPITAL OUTPT CLINIC VISIT: HCPCS | Mod: 25

## 2021-01-20 PROCEDURE — 10060 I&D ABSCESS SIMPLE/SINGLE: CPT | Performed by: NURSE PRACTITIONER

## 2021-01-20 PROCEDURE — 99215 OFFICE O/P EST HI 40 MIN: CPT | Mod: 25 | Performed by: NURSE PRACTITIONER

## 2021-01-20 ASSESSMENT — PAIN SCALES - GENERAL: PAINLEVEL: MODERATE PAIN (5)

## 2021-01-20 NOTE — PROGRESS NOTES
"  Bebeto Prescott  : 1975 Age: 45 year old Sex: male MRN: [id number]    CC:   Chief Complaint   Patient presents with     Derm Problem     continous skin infection         HPI:     Mr. Prescott was seen on 20 for an acute concern today of leg rash which he had for 5 days. He had reported that he previously had a similar infection back in November which was treated with initially Bactrim; however, Keflex was added on after lack of improvement 48 hours after antibiotic initiation. He had reported that he had been trying some topical things recommended by his naturopath; however they had not been helping.  Given that his cellulitis/abscess improved last time after starting Keflex, he was restarted on this.     He also has issues with dry skin and was given ammonium lactate lotion 12% to try. He did not try this lotion. If he continues to have persistent symptoms, it was recommended that he may need to try topical steroids.    Today he presents to clinic with infection in his left 5th finger. He reports pain, swelling and warmth to this area. He did try to squeeze it and pop it but got minimal \"white stuff\" out and then blood.      ASSESSMENT AND PLAN:    Rash and nonspecific skin eruption  - Wound Culture Aerobic Bacterial GICH (FUTURE)    Cellulitis of finger of left hand  - We will initially start him on cephalexin until culture and sensitivity has returned.  cephALEXin (KEFLEX) 250 MG capsule  Dispense: 28 capsule; Refill: 0  - Wound Culture Aerobic Bacterial GICH (FUTURE)  - DRAIN SKIN ABSCESS SIMPLE/SINGLE       21:  C&S returned indicating MRSA. Antibiotic therapy changed to vancomycin 250 mg four times a day x 7 days. Rx sent to his pharmacy. Voicemail left for patient to return call/check [employer] message for update.      DANIEL Manley, AGNP-C  Internal Medicine  2021 9:21 AM    I explained my diagnostic considerations and recommendations to the patient, who voiced understanding " and agreement with the treatment plan. All questions were answered. We discussed potential side effects of any prescribed or recommended therapies, as well as expectations for response to treatments.     Return if symptoms worsen or fail to improve.           Nursing Notes:   Valentine Torres LPN  1/21/2021  3:54 PM  Signed  Chief Complaint   Patient presents with     Derm Problem     continous skin infection      Patient presents to the clinic today for a continuous skin infection. Patient has an infection in his 5th left finger.  Valentine Torres LPN on 1/21/2021 at 3:54 PM    Medication Reconciliation: completed   Valentine Torres LPN  1/21/2021 3:52 PM        Nursing note reviewed with patient.  Accuracy and completeness verified.      SUBJECTIVE:                                                      LUIGI Score:    LUIGI-7 SCORE 11/5/2020   Total Score 0        PHQ-2 Score:     PHQ-2 ( 1999 [employer]) 1/20/2021 12/12/2020   Q1: Little interest or pleasure in doing things 0 0   Q2: Feeling down, depressed or hopeless 0 0   PHQ-2 Score 0 0          Problem List/PMH: Reviewed in EMR, and made relevant updates today.  Medications: Reviewed in EMR, and made relevant updates today.  Allergies: Reviewed in EMR, and made relevant updates today.    Current Code Status:  No Order    REVIEW OF SYSTEMS:    Review of Systems   Skin: Positive for color change and wound.        left 5th finger   All other systems reviewed and are negative.      OBJECTIVE:                                                      EXAM:     /80 (BP Location: Right arm, Patient Position: Sitting, Cuff Size: Adult Large)   Pulse 59   Temp 96.6  F (35.9  C) (Tympanic)   Resp 16   Wt 95.2 kg (209 lb 12.8 oz)   SpO2 98%   BMI 32.86 kg/m      Current Pain Score: Moderate Pain (5)     Physical Exam  Vitals signs and nursing note reviewed.   Constitutional:       Appearance: Normal appearance.   HENT:      Head: Normocephalic and atraumatic.   Musculoskeletal:  Normal range of motion.   Skin:     General: Skin is warm.      Findings: Abscess and erythema present.   Neurological:      Mental Status: He is alert and oriented to person, place, and time. Mental status is at baseline.   Psychiatric:         Mood and Affect: Mood normal.         Behavior: Behavior normal.         Thought Content: Thought content normal.         Judgment: Judgment normal.          Diagnostics Completed at this Visit:    Results for orders placed or performed in visit on 01/20/21   Wound Culture Aerobic Bacterial GICH (FUTURE)     Status: Abnormal    Specimen: finger   Result Value Ref Range    Specimen Description Finger     Culture Micro (A)      Moderate growth  Methicillin resistant Staphylococcus aureus (MRSA)         Susceptibility    Methicillin resistant staphylococcus aureus (mrsa) - DENISE     AMPICILLIN/SULBACTAM <=8/4 Resistant ug/mL     Azithromycin >4 Resistant ug/mL     CLINDAMYCIN <=0.5 Sensitive ug/mL     DAPTOMYCIN <=1 Sensitive ug/mL     ERYTHROMYCIN >4 Resistant ug/mL     LINEZOLID <=2 Sensitive ug/mL     MEROPENEM <=4 Resistant ug/mL     OXACILLIN >2 Resistant ug/mL     PENICILLIN >8 Resistant ug/mL     RIFAMPIN <=1 Sensitive ug/mL     TETRACYCLINE <=4 Sensitive ug/mL     Trimethoprim/Sulfa <=0.5/9.5 Sensitive ug/mL     VANCOMYCIN 1 Sensitive ug/mL      Disclaimer:  This note consists of words and symbols derived from keyboarding, dictation, or using voice recognition software. As a result, there may be errors in the script that have gone undetected. Please consider this when interpreting information found in this note. Please contact me if there are any concerns regarding the accuracy of the dictation.     Total time spent with this patient was 40 minutes which included chart review, visualization and interpretation of labs and/or images, time spent with patient, and documentation.

## 2021-01-21 ASSESSMENT — ENCOUNTER SYMPTOMS
WOUND: 1
ROS SKIN COMMENTS: LEFT 5TH FINGER
COLOR CHANGE: 1

## 2021-01-21 NOTE — NURSING NOTE
Chief Complaint   Patient presents with     Derm Problem     continous skin infection      Patient presents to the clinic today for a continuous skin infection. Patient has an infection in his 5th left finger.  Valentine Torres LPN on 1/21/2021 at 3:54 PM    Medication Reconciliation: completed   Valentine Torres LPN  1/21/2021 3:52 PM

## 2021-01-22 LAB
BACTERIA SPEC CULT: ABNORMAL
SPECIMEN SOURCE: ABNORMAL

## 2021-01-22 RX ORDER — VANCOMYCIN HYDROCHLORIDE 250 MG/1
250 CAPSULE ORAL 4 TIMES DAILY
Qty: 28 CAPSULE | Refills: 0 | Status: SHIPPED | OUTPATIENT
Start: 2021-01-22 | End: 2021-01-29

## 2021-01-28 ENCOUNTER — TELEPHONE (OUTPATIENT)
Dept: PEDIATRICS | Facility: OTHER | Age: 46
End: 2021-01-28

## 2021-01-28 NOTE — TELEPHONE ENCOUNTER
I would be willing to give him an injection of kenalog or I can place orders for him to go to Nurse Injection clinic to receive this. Please call and ask which he prefers. He also needs to finish his antibiotics - regardless.

## 2021-01-28 NOTE — TELEPHONE ENCOUNTER
ABEL Adame-pt wants orders for Kenalog inj and wants done today yet. There no opening with inj nurse today. Does this need a Prior Auth? Please call pt to advise. Thank you.  Ingrid Sharif

## 2021-01-28 NOTE — TELEPHONE ENCOUNTER
Patient was informed to wait until his appointment with Dr. Feldman tomorrow as he wanted a 2nd opinion. If Dr. Feldman believes Breana will help we can give it at that time.     Angie Jarrett CMA on 1/28/2021 at 2:50 PM

## 2021-01-28 NOTE — TELEPHONE ENCOUNTER
Patient states his wound began to heal with Vancomycin. He has taken about 2/3 of his antibiotic. He states that yesterday the wound starting getting sensitive, red, and painful again. He believes the wound is redeveloping. He talked with a friend of his that is a dr and he told patient that sometimes Kenalog will help. Patient states he has gut issues and thinks his immune system is weakened so the antibiotic will not work.     Angie Jarrett, MARYSOL on 1/28/2021 at 10:12 AM

## 2021-01-28 NOTE — TELEPHONE ENCOUNTER
FYI,     Patient was informed of Shira Adame's response. He stated he has an appointment for a 2nd opinion with his PCP tomorrow so he wishes to wait on Kenalog injection for now.     Angie Jarrett CMA on 1/28/2021 at 11:31 AM

## 2021-01-28 NOTE — TELEPHONE ENCOUNTER
Patient says the medication prescribed for hid MRSA is not working. Pleas call as soon as possible.    Princess Gonzalez on 1/28/2021 at 9:31 AM

## 2021-01-29 ENCOUNTER — OFFICE VISIT (OUTPATIENT)
Dept: PEDIATRICS | Facility: OTHER | Age: 46
End: 2021-01-29
Attending: INTERNAL MEDICINE
Payer: COMMERCIAL

## 2021-01-29 VITALS
TEMPERATURE: 96.7 F | DIASTOLIC BLOOD PRESSURE: 68 MMHG | OXYGEN SATURATION: 97 % | BODY MASS INDEX: 32.73 KG/M2 | SYSTOLIC BLOOD PRESSURE: 114 MMHG | HEART RATE: 61 BPM | RESPIRATION RATE: 16 BRPM | WEIGHT: 209 LBS

## 2021-01-29 DIAGNOSIS — B95.62 MRSA CELLULITIS: Primary | ICD-10-CM

## 2021-01-29 DIAGNOSIS — L03.90 MRSA CELLULITIS: Primary | ICD-10-CM

## 2021-01-29 PROCEDURE — 99214 OFFICE O/P EST MOD 30 MIN: CPT | Performed by: INTERNAL MEDICINE

## 2021-01-29 PROCEDURE — G0463 HOSPITAL OUTPT CLINIC VISIT: HCPCS

## 2021-01-29 RX ORDER — CLINDAMYCIN HCL 300 MG
300 CAPSULE ORAL 4 TIMES DAILY
Qty: 40 CAPSULE | Refills: 0 | Status: SHIPPED | OUTPATIENT
Start: 2021-01-29 | End: 2021-02-08

## 2021-01-29 ASSESSMENT — PAIN SCALES - GENERAL: PAINLEVEL: NO PAIN (0)

## 2021-01-29 NOTE — PATIENT INSTRUCTIONS
-- Stop vancomycin   -- Start clindamycin   -- Eat yogurt 1-2 times per day while on antibiotics (and for a few weeks after) to reduce the chances of diarrhea   -- okay kefir and probiotics   -- Follow-up in 7-8 days    Bleach baths  Helps reduce colonization with Staph   -- 2-3 times per week   -- 1/2 cup of bleach to a full bath tub, or 1/2 teaspoon per gallon   -- Soak in the tub 5-10 minutes  -- After, rinse with fresh water   -- Pat dry   -- Apply moisturizer

## 2021-01-29 NOTE — PROGRESS NOTES
Subjective   Bebeto Prescott is a 45 year old male who presents for follow-up MRSA infection.  His first encounter for infection was with me with redness and swelling on the leg.  Subsequently over the course of 48 hours on Bactrim his leg worsened.  He presented to the emergency room.  An ultrasound showed no fluid collection and so no incision and drainage was performed.  He was switched to Keflex.  He had some improvement.  He then worsened and had a lesion on his finger.  He was prescribed additional Keflex in Dr. Murillo's clinic.  Subsequently his rash persisted and he was seen by Ms. Adame.  A small amount of fluid was able to be removed.  Culture returned positive for MRSA.  He was switched to oral vancomycin.  He continues to have a spot on his left pinky.  His natural doctor recommended that we consider using Kenalog.    Objective   Vitals: /68 (BP Location: Right arm, Patient Position: Sitting, Cuff Size: Adult Regular)   Pulse 61   Temp 96.7  F (35.9  C) (Tympanic)   Resp 16   Wt 94.8 kg (209 lb)   SpO2 97%   BMI 32.73 kg/m              Review and Analysis of Data   I personally reviewed the following:  External notes: No  Results: Yes  Use of an independent historian: No  Independent review of a test performed by another physician: No  Discussion of management with another physician: No  Moderate risk of morbidity from additional diagnostic testing and/or treatment.    Assessment & Plan   1. MRSA cellulitis  Oral vancomycin is not absorbed systemically.  Despite being listed as a sensitive antibiotic this would need to be administered intravenously.  Since he worsened initially on Bactrim I recommend clindamycin at this time.  He should have a low threshold to return for repeat evaluation as IV antibiotics would be indicated.  We may need to consider a decontamination strategy including mupirocin nasal and chlorhexidine washes.    - clindamycin (CLEOCIN) 300 MG capsule; Take 1 capsule (300  mg) by mouth 4 times daily for 10 days  Dispense: 40 capsule; Refill: 0      Patient Instructions    -- Stop vancomycin   -- Start clindamycin   -- Eat yogurt 1-2 times per day while on antibiotics (and for a few weeks after) to reduce the chances of diarrhea   -- okay kefir and probiotics   -- Follow-up in 7-8 days    Bleach baths  Helps reduce colonization with Staph   -- 2-3 times per week   -- 1/2 cup of bleach to a full bath tub, or 1/2 teaspoon per gallon   -- Soak in the tub 5-10 minutes  -- After, rinse with fresh water   -- Pat dry   -- Apply moisturizer        Return in about 8 days (around 2/6/2021) for cellulitis.    Bebeto Weiss MD, FAAP, FACP  Internal Medicine & Pediatrics

## 2021-01-29 NOTE — NURSING NOTE
"Chief Complaint   Patient presents with     Follow Up     Infection     MRSA     Patient presents for a follow up on MRSA infection in finger. He was seen 1/20/2021 by Shira Adame. He was started on Cephalexin at first but when wound culture came back he was switched to vancomycin.     Initial There were no vitals taken for this visit. Estimated body mass index is 32.86 kg/m  as calculated from the following:    Height as of 11/7/20: 1.702 m (5' 7\").    Weight as of 1/20/21: 95.2 kg (209 lb 12.8 oz).  Medication Reconciliation: complete    Angie Jarrett MA  "

## 2021-02-12 ENCOUNTER — OFFICE VISIT (OUTPATIENT)
Dept: INTERNAL MEDICINE | Facility: OTHER | Age: 46
End: 2021-02-12
Attending: INTERNAL MEDICINE
Payer: COMMERCIAL

## 2021-02-12 ENCOUNTER — TELEPHONE (OUTPATIENT)
Dept: PEDIATRICS | Facility: OTHER | Age: 46
End: 2021-02-12

## 2021-02-12 VITALS
DIASTOLIC BLOOD PRESSURE: 78 MMHG | WEIGHT: 209 LBS | SYSTOLIC BLOOD PRESSURE: 128 MMHG | BODY MASS INDEX: 32.73 KG/M2 | HEART RATE: 68 BPM | RESPIRATION RATE: 16 BRPM

## 2021-02-12 DIAGNOSIS — A49.02 MRSA INFECTION: Primary | ICD-10-CM

## 2021-02-12 PROCEDURE — G0463 HOSPITAL OUTPT CLINIC VISIT: HCPCS

## 2021-02-12 PROCEDURE — 99213 OFFICE O/P EST LOW 20 MIN: CPT | Performed by: INTERNAL MEDICINE

## 2021-02-12 RX ORDER — LINEZOLID 600 MG/1
600 TABLET, FILM COATED ORAL 2 TIMES DAILY
Qty: 20 TABLET | Refills: 0 | Status: SHIPPED | OUTPATIENT
Start: 2021-02-12

## 2021-02-12 ASSESSMENT — PAIN SCALES - GENERAL: PAINLEVEL: NO PAIN (0)

## 2021-02-12 ASSESSMENT — ENCOUNTER SYMPTOMS
ALLERGIC/IMMUNOLOGIC NEGATIVE: 1
CONSTITUTIONAL NEGATIVE: 1
EYES NEGATIVE: 1
ENDOCRINE NEGATIVE: 1

## 2021-02-12 NOTE — PROGRESS NOTES
Chief Complaint: Follow-up finger infection with MRSA.    HPI: He is here for follow-up finger infection.  See previous notes.  He was initially started on treatment with cephalosporins.  Subsequent culture showed MRSA and he was placed on oral vancomycin.  He then saw Dr. Feldman and was placed on clindamycin.  The infection seemed to get better on the clindamycin but has now returned.  There has been no drainage.  He does not have any systemic symptoms such as fever, etc.  This fall he had some areas of cellulitis on his legs.    Past Medical History:   Diagnosis Date     Carpal tunnel syndrome of right wrist     11/17/2016     Radial collateral ligament sprain of elbow     10/20/2016       Past Surgical History:   Procedure Laterality Date     COLONOSCOPY      2006,Normal,  Follow-up recommended at age 49.     OTHER SURGICAL HISTORY      1999,69866.0,WI EXCIS SPERMATOCELE,excision of spermatocele and hydrocele, [Other]       Allergies   Allergen Reactions     Mold Other (See Comments)     Moldy leaves: sneezing, runny nose, cough     Trichophyton Other (See Comments)     Moldy leaves: sneezing, runny nose, cough       Current Outpatient Medications   Medication Sig Dispense Refill     linezolid (ZYVOX) 600 MG tablet Take 1 tablet (600 mg) by mouth 2 times daily 20 tablet 0     PARoxetine (PAXIL) 10 MG tablet Take 1 tablet (10 mg) by mouth every morning 90 tablet 3       Review of Systems   Constitutional: Negative.    Eyes: Negative.    Endocrine: Negative.    Allergic/Immunologic: Negative.        Physical Exam  Vitals signs and nursing note reviewed.   Constitutional:       General: He is not in acute distress.     Appearance: Normal appearance. He is not ill-appearing, toxic-appearing or diaphoretic.   Skin:     Comments: He had erythema on his left fifth digit.  No fluctuance or drainage.  Location and appearance essentially identical to photograph taken on July 29 visit with Dr. Feldman.   Neurological:       Mental Status: He is alert.         Assessment:        ICD-10-CM    1. MRSA infection  A49.02 linezolid (ZYVOX) 600 MG tablet       Plan: Discussion with the patient.  He has been on sulfa in the past without improvement as well as most recently clindamycin with incomplete cure.  I would recommend a trial of linezolid 600 mg twice daily.  He will stop his paroxetine while he is on this medication.  Schedule a follow-up visit with Dr. Feldman in 10 to 14 days.  Obviously return sooner for any worsening symptoms.

## 2021-02-12 NOTE — TELEPHONE ENCOUNTER
Patient is calling regarding his MRSA. He is done with the antibiotics again and it is coming back. He would like a referral to see someone for the skin issues. Also he would like to get his antibiotics refilled so that MRSA won't come back over the weekend.    Reason for call: Medication or medication refill    Name of medication requested: Clindamycin    Are you out of the medication? Yes    What pharmacy do you use? [facility]    Preferred method for responding to this message: Telephone Call    Phone number patient can be reached at: Home number on file [phone] (home)    If we cannot reach you directly, may we leave a detailed response at the number you provided? Yes

## 2021-02-12 NOTE — TELEPHONE ENCOUNTER
Patient informed Dr. Feldman is out of clinic until Thursday. Per his note from 1/29/2021, he stated patient should follow up around 2/6/2021 with clinic appointment. Patient informed of this. He stated he has an appointment with Dr. Valdez today.     Angie Jarrett CMA on 2/12/2021 at 11:06 AM

## 2021-04-25 ENCOUNTER — HEALTH MAINTENANCE LETTER (OUTPATIENT)
Age: 46
End: 2021-04-25

## 2021-10-09 ENCOUNTER — HEALTH MAINTENANCE LETTER (OUTPATIENT)
Age: 46
End: 2021-10-09

## 2022-05-21 ENCOUNTER — HEALTH MAINTENANCE LETTER (OUTPATIENT)
Age: 47
End: 2022-05-21

## 2022-07-26 NOTE — TELEPHONE ENCOUNTER
"Patient states sent my chart message yesterday but message end up going to DR. Feldman.  \"Dr Coffman,     Yes, I am still having symptoms after taking 40mg of prilosec.       Please move ahead with lining up the endoscopy.  The sooner the better...I'm have continued to be uncomfortable.     Thanks!   Bebeto \"    Patient states he spoke with Mena to get EGD scheduled and states that the sooner they could get him in was 6/17.  Patient did say that Mena was going to see if he could be worked in sooner.    Patient states that the pain is not any better with the increase of Prilosec and he continues to have pain unable to sleep or work.      Will route to Vanesa Coffman for review and consideration.      Fina Duffy RN on 6/5/2019 at 10:10 AM                "
Called patient and verified last name and date of birth. Informed him of referral and no further questions.    Otilia Tolliver LPN on 6/5/2019 at 12:42 PM   
Called wife and verified last name and date of birth.     Informed her of note and she will be picking up the prescription. She also states he is having an EGD done tomorrow morning and needs a referral for that.   He is having the surgery done at [facility] and needs the referral done today.     Otilia Tolliver LPN on 6/5/2019 at 11:55 AM   
Can we clarify where this was sent?    We need it to be sent to [facility] not [facility] [place].     Otilia Tolliver LPN on 6/5/2019 at 12:42 PM   
I sent a referral for EGD Procedure at [facility]. He may have duplicate consults. Can you make sure the one here on 6/17 gets canceled if he is going to have it done tomorrow?   
Please let Bebeto know that I sent a prescription for further medication to help with stomach pain -Carafate. He should take it 4 times a day on an empty stomach. He can take it along with prilosec. If no improvement with this medication he should let me know.   
Pt stated that he [facility] and then seen [facility] on Monday. States that the pain has not subsided and that he is missing work and not sleeping due to pain.   
(1) Other Medications/None

## 2022-09-17 ENCOUNTER — HEALTH MAINTENANCE LETTER (OUTPATIENT)
Age: 47
End: 2022-09-17

## 2023-03-21 ENCOUNTER — TRANSFERRED RECORDS (OUTPATIENT)
Dept: HEALTH INFORMATION MANAGEMENT | Facility: OTHER | Age: 48
End: 2023-03-21

## 2023-04-11 ENCOUNTER — HOSPITAL ENCOUNTER (OUTPATIENT)
Dept: PHYSICAL THERAPY | Facility: OTHER | Age: 48
Setting detail: THERAPIES SERIES
Discharge: HOME | End: 2023-04-11
Attending: PODIATRIST
Payer: COMMERCIAL

## 2023-04-11 DIAGNOSIS — M76.62 ACHILLES TENDONITIS, BILATERAL: ICD-10-CM

## 2023-04-11 DIAGNOSIS — M76.61 ACHILLES TENDONITIS, BILATERAL: ICD-10-CM

## 2023-04-11 PROCEDURE — 97161 PT EVAL LOW COMPLEX 20 MIN: CPT | Mod: GP

## 2023-04-11 PROCEDURE — 97110 THERAPEUTIC EXERCISES: CPT | Mod: GP

## 2023-04-11 NOTE — PROGRESS NOTES
[facility]    OUTPATIENT PHYSICAL THERAPY ORTHOPEDIC EVALUATION  PLAN OF TREATMENT FOR OUTPATIENT REHABILITATION  (COMPLETE FOR INITIAL CLAIMS ONLY)  Patient's Last Name, First Name, M.I.  YOB: 1975  Bebeto Prescott    Provider s Name:  [facility]   Medical Record No.  [id number]   Start of Care Date:  04/11/23   Onset Date:      Type:     _X__PT   ___OT   ___SLP Medical Diagnosis:  M76.61, M76.62 (ICD-10-CM) - Achilles tendonitis, bilateral     PT Diagnosis:  B achilles tendonosis   Visits from SOC:  1      _________________________________________________________________________________  Plan of Treatment/Functional Goals:  balance training, gait training, joint mobilization, manual therapy, neuromuscular re-education, ROM, strengthening, stretching     Cryotherapy, Electrical stimulation, Hot packs, TENS, Ultrasound     Goals  Goal Identifier: jogging  Goal Description: Patient will jog for 10 minutes without ankle pain within 6 weeks  Target Date: 06/06/23    Goal Identifier: PF strength  Goal Description: Patient will complete MMT for PF with repetitive heel raises within 4 weeks  Target Date: 05/09/23    Goal Identifier: HEP  Goal Description: Patient will be independent with HEp and appropriate progressions within 4 weeks  Target Date: 05/09/23      Therapy Frequency:  2 times/Week  Predicted Duration of Therapy Intervention:  6 weeks    Sonam Gallardo PT                 I CERTIFY THE NEED FOR THESE SERVICES FURNISHED UNDER        THIS PLAN OF TREATMENT AND WHILE UNDER MY CARE .             Physician Signature               Date    X_____________________________________________________                         Certification Date From:  04/11/23   Certification Date To:  07/04/23    Referring Provider:  KIERRA Finley    Initial Assessment        See Epic  Evaluation Start of Care Date: 04/11/23

## 2023-04-11 NOTE — PROGRESS NOTES
"   04/11/23 1300   General Information   Type of Visit Initial OP Ortho PT Evaluation   Start of Care Date 04/11/23   Referring Physician KIERRA Finley   Patient/Family Goals Statement \"I want to be able to run/be active like I normally am\"   Orders Evaluate and Treat   Date of Order 03/21/23   Certification Required? Yes   Medical Diagnosis M76.61, M76.62 (ICD-10-CM) - Achilles tendonitis, bilateral   Surgical/Medical history reviewed Yes   Precautions/Limitations no known precautions/limitations   Body Part(s)   Body Part(s) Ankle/Foot   Presentation and Etiology   Pertinent history of current problem (include personal factors and/or comorbidities that impact the POC) Patient reports this has been a chronic issue starting in 2009/2008 after he trained improperly for a half marathon. Patient reports he has had multiple attempts to get better and has started working with a DPM who gave him a diagnosis of achilles tendonosis. Patient states he is unable to wear flat shoes due to flar-ups. Patient also reports he is unable to do sport activities of jogging that he prefers due to pain with activity. Has been trying different shoes and is getting fitted for a custom orthotic with heel lift (unsure if there will be an arch support component) Patient also states he is going to be starting an antiinflammatory. Overall activity limited with exercise/cardio/jogging due to pain. Of note, patient has history of ankle sprains (R more than L) and denies any past surgerical history to B ankles.   Impairments L. Tingling;A. Pain   Functional Limitations perform desired leisure / sports activities   Symptom Location B ankles   How/Where did it occur During recreation/sports   Chronicity Recurrent   Pain rating (0-10 point scale) Best (/10);Worst (/10)   Best (/10) 2   Worst (/10) 3   Pain quality B. Dull;C. Aching   Frequency of pain/symptoms C. With activity   Pain/symptoms exacerbated by B. Walking;C. Lifting;M. " Other  (jogging)   Pain/symptoms eased by C. Rest   Current / Previous Interventions   Diagnostic Tests:   (none in system)   Fall Risk Screen   Fall screen completed by PT   Have you fallen 2 or more times in the past year? No   Have you fallen and had an injury in the past year? No   Abuse Screen (yes response referral indicated)   Feels Unsafe at Home or Work/School no   Feels Threatened by Someone no   Does Anyone Try to Keep You From Having Contact with Others or Doing Things Outside Your Home? no   Physical Signs of Abuse Present no   Ankle/Foot Objective Findings   Ankle/Foot Flexibility Comments decreased eversion/inversion at talo/crural joint -- appears most comes from midfoot   Talar Tilt Test increased tilt into inversion B   Side (if bilateral, select both right and left) Right;Left   Palpation tenderness to palpation/massage in B calf muscles   Gait/Locomotion equal step length B, decreased heel strike B   Balance/ Proprioception (Single Leg Stance) increased instability B without UE support   Foot Position In Standing B midfoot pronation; talocalcaneal in neutral position   Right PF/Inversion Strength 5/5   Right PF/Eversion Strength 5/5   Right PF Strength 10 heel raises prior to fatigue   Left PF/Inversion Strength 5/5   Left PF/Eversion Strength 5/5   Left PF Strength 10 heel raises prior to fatigue   Right DF (Knee Ext) AROM 5   Left DF (Knee Ext) AROM 5   Right PF AROM 40   Left PF AROM 40   Right Calcanceal Inversion AROM 30   Left Calcanceal Inversion AROM 30   Right Calcaneal Eversion AROM 20   Left Calcaneal Eversion AROM 10   Right DF/Inversion Strength 5/5   Left DF/Inversion Strength 5/5   Right DF/Eversion Strength 5/5   Left DF/Eversion Strength 5/5   Planned Therapy Interventions   Planned Therapy Interventions balance training;gait training;joint mobilization;manual therapy;neuromuscular re-education;ROM;strengthening;stretching   Planned Modality Interventions   Planned Modality  Interventions Cryotherapy;Electrical stimulation;Hot packs;TENS;Ultrasound   Clinical Impression   Criteria for Skilled Therapeutic Interventions Met yes, treatment indicated   PT Diagnosis B achilles tendonosis   Influenced by the following impairments pain, decreased ROM, impaired eccentric control   Functional limitations due to impairments impaired jogging, impaired sport   Clinical Presentation Stable/Uncomplicated   Clinical Presentation Rationale Patient presents with history of B ankle discomfort/pain, limited ankle DF and impaired eccentric control.   Clinical Decision Making (Complexity) Low complexity   Therapy Frequency 2 times/Week   Predicted Duration of Therapy Intervention (days/wks) 6 weeks   Risk & Benefits of therapy have been explained Yes   Patient, Family & other staff in agreement with plan of care Yes   Clinical Impression Comments y   Education Assessment   Preferred Learning Style Listening;Reading;Demonstration;Pictures/video   Barriers to Learning No barriers   ORTHO GOALS   PT Ortho Eval Goals 1;2;3   Ortho Goal 1   Goal Identifier jogging   Goal Description Patient will jog for 10 minutes without ankle pain within 6 weeks   Target Date 06/06/23   Ortho Goal 2   Goal Identifier PF strength   Goal Description Patient will complete MMT for PF with repetitive heel raises within 4 weeks   Target Date 05/09/23   Ortho Goal 3   Goal Identifier HEP   Goal Description Patient will be independent with HEp and appropriate progressions within 4 weeks   Target Date 05/09/23   Total Evaluation Time   PT Eval, Low Complexity Minutes (16299) 30   Therapy Certification   Certification date from 04/11/23   Certification date to 07/04/23   Medical Diagnosis M76.61, M76.62 (ICD-10-CM) - Achilles tendonitis, bilateral

## 2023-04-13 ENCOUNTER — HOSPITAL ENCOUNTER (OUTPATIENT)
Dept: PHYSICAL THERAPY | Facility: OTHER | Age: 48
Setting detail: THERAPIES SERIES
Discharge: HOME | End: 2023-04-13
Attending: PODIATRIST
Payer: COMMERCIAL

## 2023-04-13 PROCEDURE — 97110 THERAPEUTIC EXERCISES: CPT | Mod: GP

## 2023-04-18 ENCOUNTER — HOSPITAL ENCOUNTER (OUTPATIENT)
Dept: PHYSICAL THERAPY | Facility: OTHER | Age: 48
Setting detail: THERAPIES SERIES
Discharge: HOME | End: 2023-04-18
Attending: PODIATRIST
Payer: COMMERCIAL

## 2023-04-18 PROCEDURE — 97110 THERAPEUTIC EXERCISES: CPT | Mod: GP

## 2023-04-24 ENCOUNTER — HOSPITAL ENCOUNTER (OUTPATIENT)
Dept: PHYSICAL THERAPY | Facility: OTHER | Age: 48
Setting detail: THERAPIES SERIES
Discharge: HOME | End: 2023-04-24
Attending: PODIATRIST
Payer: COMMERCIAL

## 2023-04-24 PROCEDURE — 97110 THERAPEUTIC EXERCISES: CPT | Mod: GP

## 2023-04-27 ENCOUNTER — HOSPITAL ENCOUNTER (OUTPATIENT)
Dept: PHYSICAL THERAPY | Facility: OTHER | Age: 48
Setting detail: THERAPIES SERIES
Discharge: HOME | End: 2023-04-27
Attending: PODIATRIST
Payer: COMMERCIAL

## 2023-04-27 PROCEDURE — 97110 THERAPEUTIC EXERCISES: CPT | Mod: GP

## 2023-05-01 ENCOUNTER — HOSPITAL ENCOUNTER (OUTPATIENT)
Dept: PHYSICAL THERAPY | Facility: OTHER | Age: 48
Setting detail: THERAPIES SERIES
Discharge: HOME | End: 2023-05-01
Attending: PODIATRIST
Payer: COMMERCIAL

## 2023-05-01 PROCEDURE — 97110 THERAPEUTIC EXERCISES: CPT | Mod: GP

## 2023-05-04 ENCOUNTER — HOSPITAL ENCOUNTER (OUTPATIENT)
Dept: PHYSICAL THERAPY | Facility: OTHER | Age: 48
Setting detail: THERAPIES SERIES
Discharge: HOME | End: 2023-05-04
Attending: PODIATRIST
Payer: COMMERCIAL

## 2023-05-04 PROCEDURE — 97110 THERAPEUTIC EXERCISES: CPT | Mod: GP

## 2023-05-06 ENCOUNTER — HEALTH MAINTENANCE LETTER (OUTPATIENT)
Age: 48
End: 2023-05-06

## 2023-06-06 ENCOUNTER — THERAPY VISIT (OUTPATIENT)
Dept: PHYSICAL THERAPY | Facility: OTHER | Age: 48
End: 2023-06-06
Attending: PODIATRIST
Payer: COMMERCIAL

## 2023-06-06 DIAGNOSIS — M76.62 ACHILLES TENDONITIS, BILATERAL: Primary | ICD-10-CM

## 2023-06-06 DIAGNOSIS — M76.61 ACHILLES TENDONITIS, BILATERAL: Primary | ICD-10-CM

## 2023-06-06 PROCEDURE — 97110 THERAPEUTIC EXERCISES: CPT | Mod: GP

## 2023-07-14 NOTE — PROGRESS NOTES
06/06/23 0500   Appointment Info   Signing clinician's name / credentials Sonam Gallardo, PT   Visits Used 8   Medical Diagnosis M76.61, M76.62 (ICD-10-CM) - Achilles tendonitis, bilateral   PT Tx Diagnosis B achilles tendonosis   Precautions/Limitations none   Progress Note/Certification   Therapy Frequency 1-2 week progressing to 1-2 month   Predicted Duration 3 months   GOALS   PT Goals 2;3   PT Goal 1   Goal Identifier jogging   Goal Description Patient will jog for 10 minutes without ankle pain within 6 weeks   Goal Progress not able to participate in running at discharge   Target Date 06/06/23   PT Goal 2   Goal Identifier PF strength   Goal Description Patient will complete MMT for PF with repetitive heel raises within 4 weeks   Goal Progress unable to complete 10+ heel raises SLS   Target Date 05/09/23   PT Goal 3   Goal Identifier HEP   Goal Description Patient will be independent with HEP and appropriate progressions within 4 weeks   Goal Progress patient continues to perfrom HEP daily and progressions as tolerated   Target Date 05/09/23   Date Met 05/04/23   Subjective Report   Subjective Report Reports soreness has been consistent -- reports he hasn't been doing exercises as he has had more general pain -- regressing back to B LE heel raises and stretches   Objective Measures   Objective Measures Objective Measure 1;Objective Measure 2   Objective Measure 1   Objective Measure ankle DF   Details 5 degrees B   Objective Measure 2   Objective Measure PF MMT   Details able to perform 10 heel raises   Treatment Interventions (PT)   Interventions Therapeutic Procedure/Exercise   Therapeutic Procedure/Exercise   Therapeutic Procedures: strength, endurance, ROM, flexibillity minutes (72013) 30   Skilled Intervention stretching, strengthening, education   Patient Response/Progress removed heel raises from HEP/exercises -- continued focus on eccentric strengthening   Ther Proc 1 bicycle x6 min; standing calf  "stretch on inverted board with knee extended and knee flexed B x30 seconds x3 sets; double leg eccentric heel lowering and heel raise on 6\" step x10 reps B x2 sets; SLS on foam x30 seconds x3; DEFER standing lunge x10 reps B; B graston x10 min B calf with neutral ankle and DF/PF AROM   Education   Learner/Method Patient;Reading;Demonstration;Pictures/Video   Education Comments see above   Plan   Homework HEP   Home program calf stretch with knee extended and knee flexed; eccentric control lowering from 4\" step   Plan for next session follow up in 2 weeks as necessary prior to patient's trip -- focus on eccentrics only; discharge concentric exercises   Total Session Time   Timed Code Treatment Minutes 30   Total Treatment Time (sum of timed and untimed services) 30   Medicare Claim Information   Medical Diagnosis M76.61, M76.62 (ICD-10-CM) - Achilles tendonitis, bilateral   PT Diagnosis B achilles tendonosis   Start of Care Date 04/11/23   Certification date from 04/11/23   Session Number   Additional Session Number 7/12         DISCHARGE  Reason for Discharge: Patient chooses to discontinue therapy.  Patient has failed to schedule further appointments.    Equipment Issued: none    Discharge Plan: Patient to continue home program.  Follow up with KIERRA Finley as needed     Referring Provider:  Juan Ramon Finley DPM    "

## 2023-08-24 ENCOUNTER — OFFICE VISIT (OUTPATIENT)
Dept: FAMILY MEDICINE | Facility: OTHER | Age: 48
End: 2023-08-24
Attending: NURSE PRACTITIONER
Payer: COMMERCIAL

## 2023-08-24 VITALS
BODY MASS INDEX: 32.53 KG/M2 | HEART RATE: 70 BPM | OXYGEN SATURATION: 98 % | RESPIRATION RATE: 16 BRPM | TEMPERATURE: 97.7 F | SYSTOLIC BLOOD PRESSURE: 124 MMHG | DIASTOLIC BLOOD PRESSURE: 72 MMHG | WEIGHT: 207.7 LBS

## 2023-08-24 DIAGNOSIS — R39.9 LOWER URINARY TRACT SYMPTOMS: Primary | ICD-10-CM

## 2023-08-24 LAB
ALBUMIN UR-MCNC: NEGATIVE MG/DL
APPEARANCE UR: CLEAR
BASOPHILS # BLD AUTO: 0.1 10E3/UL (ref 0–0.2)
BASOPHILS NFR BLD AUTO: 1 %
BILIRUB UR QL STRIP: NEGATIVE
C TRACH DNA SPEC QL PROBE+SIG AMP: NEGATIVE
COLOR UR AUTO: YELLOW
EOSINOPHIL # BLD AUTO: 0.3 10E3/UL (ref 0–0.7)
EOSINOPHIL NFR BLD AUTO: 5 %
ERYTHROCYTE [DISTWIDTH] IN BLOOD BY AUTOMATED COUNT: 12.5 % (ref 10–15)
GLUCOSE UR STRIP-MCNC: NEGATIVE MG/DL
HCT VFR BLD AUTO: 46.1 % (ref 40–53)
HGB BLD-MCNC: 16.5 G/DL (ref 13.3–17.7)
HGB UR QL STRIP: NEGATIVE
HOLD SPECIMEN: NORMAL
IMM GRANULOCYTES # BLD: 0 10E3/UL
IMM GRANULOCYTES NFR BLD: 0 %
KETONES UR STRIP-MCNC: NEGATIVE MG/DL
LEUKOCYTE ESTERASE UR QL STRIP: NEGATIVE
LYMPHOCYTES # BLD AUTO: 2.6 10E3/UL (ref 0.8–5.3)
LYMPHOCYTES NFR BLD AUTO: 43 %
MCH RBC QN AUTO: 31.3 PG (ref 26.5–33)
MCHC RBC AUTO-ENTMCNC: 35.8 G/DL (ref 31.5–36.5)
MCV RBC AUTO: 88 FL (ref 78–100)
MONOCYTES # BLD AUTO: 0.5 10E3/UL (ref 0–1.3)
MONOCYTES NFR BLD AUTO: 8 %
MUCOUS THREADS #/AREA URNS LPF: PRESENT /LPF
N GONORRHOEA DNA SPEC QL NAA+PROBE: NEGATIVE
NEUTROPHILS # BLD AUTO: 2.6 10E3/UL (ref 1.6–8.3)
NEUTROPHILS NFR BLD AUTO: 43 %
NITRATE UR QL: NEGATIVE
NRBC # BLD AUTO: 0 10E3/UL
NRBC BLD AUTO-RTO: 0 /100
PH UR STRIP: 5.5 [PH] (ref 5–9)
PLATELET # BLD AUTO: 223 10E3/UL (ref 150–450)
PSA SERPL DL<=0.01 NG/ML-MCNC: 2.31 NG/ML (ref 0–2.5)
RBC # BLD AUTO: 5.27 10E6/UL (ref 4.4–5.9)
RBC URINE: <1 /HPF
SP GR UR STRIP: 1.01 (ref 1–1.03)
UROBILINOGEN UR STRIP-MCNC: NORMAL MG/DL
WBC # BLD AUTO: 6.1 10E3/UL (ref 4–11)
WBC URINE: <1 /HPF

## 2023-08-24 PROCEDURE — 99213 OFFICE O/P EST LOW 20 MIN: CPT | Performed by: STUDENT IN AN ORGANIZED HEALTH CARE EDUCATION/TRAINING PROGRAM

## 2023-08-24 PROCEDURE — 87491 CHLMYD TRACH DNA AMP PROBE: CPT | Mod: ZL | Performed by: STUDENT IN AN ORGANIZED HEALTH CARE EDUCATION/TRAINING PROGRAM

## 2023-08-24 PROCEDURE — G0463 HOSPITAL OUTPT CLINIC VISIT: HCPCS

## 2023-08-24 PROCEDURE — 36415 COLL VENOUS BLD VENIPUNCTURE: CPT | Mod: ZL | Performed by: STUDENT IN AN ORGANIZED HEALTH CARE EDUCATION/TRAINING PROGRAM

## 2023-08-24 PROCEDURE — 87591 N.GONORRHOEAE DNA AMP PROB: CPT | Mod: ZL | Performed by: STUDENT IN AN ORGANIZED HEALTH CARE EDUCATION/TRAINING PROGRAM

## 2023-08-24 PROCEDURE — 85025 COMPLETE CBC W/AUTO DIFF WBC: CPT | Mod: ZL | Performed by: STUDENT IN AN ORGANIZED HEALTH CARE EDUCATION/TRAINING PROGRAM

## 2023-08-24 PROCEDURE — 84153 ASSAY OF PSA TOTAL: CPT | Mod: ZL | Performed by: STUDENT IN AN ORGANIZED HEALTH CARE EDUCATION/TRAINING PROGRAM

## 2023-08-24 PROCEDURE — 81001 URINALYSIS AUTO W/SCOPE: CPT | Mod: ZL | Performed by: STUDENT IN AN ORGANIZED HEALTH CARE EDUCATION/TRAINING PROGRAM

## 2023-08-24 ASSESSMENT — PAIN SCALES - GENERAL: PAINLEVEL: NO PAIN (1)

## 2023-08-24 NOTE — PROGRESS NOTES
"  Assessment & Plan     (R39.9) Lower urinary tract symptoms  (primary encounter diagnosis)    Comment: Urinary tract symptoms, no evidence of elevated PSA, not tender on exam.  UA was negative today.  Gonorrhea chlamydia negative.  No elevation in white blood cells.  No antibiotics indicated at this time.    Plan: UA with Microscopic reflex to Culture,         GC/Chlamydia by PCR, CBC and Differential,         Prostate Specific Antigen       Follow-up with primary care for further evaluation and management.  Return to rapid clinic or go to the ER if symptoms worsen or change.  He is comfortable and agreeable with this plan.      Alida Darling PA-C  [facility] AND [facility]    Dahiana Tate is a 48 year old, presenting for the following health issues:  Pain (Prostate)      HPI     Patient presents today with concerns of a \"warm feeling\" near his prostate area near the base of his testicles.  States this started a few days ago.  Has stayed about the same.  He denies any burning, changes in color, or odor to his urine.  He does endorse sexual activity with his wife.  No changes in stools.    He does endorse similar feeling to this back in 2019/2020 where he did see urology for an elevated PSA level.  There was no conclusive findings.  PSA level did return to normal after approximately 3 to 6 months.      Review of Systems   Constitutional, HEENT, cardiovascular, pulmonary, gi and gu systems are negative, except as otherwise noted.      Objective    /72   Pulse 70   Temp 97.7  F (36.5  C) (Tympanic)   Resp 16   Wt 94.2 kg (207 lb 11.2 oz)   SpO2 98%   BMI 32.53 kg/m    Body mass index is 32.53 kg/m .    Physical Exam   GENERAL: healthy, alert and no distress  RESP: lungs clear to auscultation - no rales, rhonchi or wheezes  CV: regular rate and rhythm, normal S1 S2, no S3 or S4, no murmur, click or rub, no peripheral edema and peripheral pulses strong  ABDOMEN: soft, nontender, no " hepatosplenomegaly, no masses and bowel sounds normal  RECTAL (male): normal sphincter tone, no rectal masses, prostate without tenderness or any abnormality palpated  MS: no gross musculoskeletal defects noted, no edema    Results for orders placed or performed in visit on 08/24/23   UA with Microscopic reflex to Culture     Status: Abnormal    Specimen: Urine, Clean Catch   Result Value Ref Range    Color Urine Yellow Colorless, Straw, Light Yellow, Yellow    Appearance Urine Clear Clear    Glucose Urine Negative Negative mg/dL    Bilirubin Urine Negative Negative    Ketones Urine Negative Negative mg/dL    Specific Gravity Urine 1.009 1.000 - 1.030    Blood Urine Negative Negative    pH Urine 5.5 5.0 - 9.0    Protein Albumin Urine Negative Negative mg/dL    Urobilinogen Urine Normal Normal, 2.0 mg/dL    Nitrite Urine Negative Negative    Leukocyte Esterase Urine Negative Negative    Mucus Urine Present (A) None Seen /LPF    RBC Urine <1 <=2 /HPF    WBC Urine <1 <=5 /HPF    Narrative    Urine Culture not indicated   Prostate Specific Antigen     Status: Normal   Result Value Ref Range    PSA Tumor Marker 2.31 0.00 - 2.50 ng/mL    Narrative    This result is obtained using the Roche Elecsys total PSA method on the elen e601 immunoassay analyzer. Results obtained with different assay methods or kits cannot be used interchangeably.   CBC with platelets and differential     Status: None   Result Value Ref Range    WBC Count 6.1 4.0 - 11.0 10e3/uL    RBC Count 5.27 4.40 - 5.90 10e6/uL    Hemoglobin 16.5 13.3 - 17.7 g/dL    Hematocrit 46.1 40.0 - 53.0 %    MCV 88 78 - 100 fL    MCH 31.3 26.5 - 33.0 pg    MCHC 35.8 31.5 - 36.5 g/dL    RDW 12.5 10.0 - 15.0 %    Platelet Count 223 150 - 450 10e3/uL    % Neutrophils 43 %    % Lymphocytes 43 %    % Monocytes 8 %    % Eosinophils 5 %    % Basophils 1 %    % Immature Granulocytes 0 %    NRBCs per 100 WBC 0 <1 /100    Absolute Neutrophils 2.6 1.6 - 8.3 10e3/uL    Absolute  Lymphocytes 2.6 0.8 - 5.3 10e3/uL    Absolute Monocytes 0.5 0.0 - 1.3 10e3/uL    Absolute Eosinophils 0.3 0.0 - 0.7 10e3/uL    Absolute Basophils 0.1 0.0 - 0.2 10e3/uL    Absolute Immature Granulocytes 0.0 <=0.4 10e3/uL    Absolute NRBCs 0.0 10e3/uL   Extra Tube     Status: None    Narrative    The following orders were created for panel order Extra Tube.  Procedure                               Abnormality         Status                     ---------                               -----------         ------                     Extra Red Top Tube[[id number]]                               Final result                 Please view results for these tests on the individual orders.   Extra Red Top Tube     Status: None   Result Value Ref Range    Hold Specimen x    GC/Chlamydia by PCR     Status: Normal    Specimen: Urine, Voided; Swab   Result Value Ref Range    Chlamydia Trachomatis Negative Negative    Neisseria gonorrhoeae Negative Negative    Narrative    Assay performed using [employer] real-time, reverse-transcriptase PCR.   CBC and Differential     Status: None    Narrative    The following orders were created for panel order CBC and Differential.  Procedure                               Abnormality         Status                     ---------                               -----------         ------                     CBC with platelets and d...[[id number]]                      Final result                 Please view results for these tests on the individual orders.

## 2023-08-24 NOTE — NURSING NOTE
Patient presents to clinic for concerns of prostate pain  /72   Pulse 70   Temp 97.7  F (36.5  C) (Tympanic)   Resp 16   Wt 94.2 kg (207 lb 11.2 oz)   SpO2 98%   BMI 32.53 kg/m    Marilin Madsen LPN on 8/24/2023 at 2:35 PM

## 2024-07-13 ENCOUNTER — HEALTH MAINTENANCE LETTER (OUTPATIENT)
Age: 49
End: 2024-07-13

## 2024-11-04 ENCOUNTER — OFFICE VISIT (OUTPATIENT)
Dept: FAMILY MEDICINE | Facility: OTHER | Age: 49
End: 2024-11-04

## 2024-11-04 VITALS
WEIGHT: 208 LBS | TEMPERATURE: 97.6 F | HEART RATE: 69 BPM | HEIGHT: 68 IN | BODY MASS INDEX: 31.52 KG/M2 | SYSTOLIC BLOOD PRESSURE: 152 MMHG | RESPIRATION RATE: 16 BRPM | DIASTOLIC BLOOD PRESSURE: 114 MMHG | OXYGEN SATURATION: 97 %

## 2024-11-04 DIAGNOSIS — S61.215A LACERATION OF LEFT RING FINGER WITHOUT FOREIGN BODY WITHOUT DAMAGE TO NAIL, INITIAL ENCOUNTER: Primary | ICD-10-CM

## 2024-11-04 DIAGNOSIS — S61.411A LACERATION OF MULTIPLE SITES OF RIGHT HAND AND FINGERS WITHOUT COMPLICATION, INITIAL ENCOUNTER: ICD-10-CM

## 2024-11-04 DIAGNOSIS — S61.219A LACERATION OF MULTIPLE SITES OF RIGHT HAND AND FINGERS WITHOUT COMPLICATION, INITIAL ENCOUNTER: ICD-10-CM

## 2024-11-04 PROCEDURE — G0463 HOSPITAL OUTPT CLINIC VISIT: HCPCS

## 2024-11-04 PROCEDURE — 250N000009 HC RX 250: Mod: JZ | Performed by: NURSE PRACTITIONER

## 2024-11-04 PROCEDURE — 96372 THER/PROPH/DIAG INJ SC/IM: CPT | Performed by: NURSE PRACTITIONER

## 2024-11-04 RX ORDER — IBUPROFEN 200 MG
600 TABLET ORAL ONCE
Status: ACTIVE | OUTPATIENT
Start: 2024-11-04

## 2024-11-04 RX ORDER — LIDOCAINE HYDROCHLORIDE 10 MG/ML
4 INJECTION, SOLUTION EPIDURAL; INFILTRATION; INTRACAUDAL; PERINEURAL ONCE
Status: COMPLETED | OUTPATIENT
Start: 2024-11-04 | End: 2024-11-04

## 2024-11-04 RX ORDER — TRIAMCINOLONE ACETONIDE 1 MG/G
2 CREAM TOPICAL
COMMUNITY
Start: 2024-09-10

## 2024-11-04 RX ADMIN — LIDOCAINE HYDROCHLORIDE 4 ML: 10 INJECTION, SOLUTION EPIDURAL; INFILTRATION; INTRACAUDAL; PERINEURAL at 19:27

## 2024-11-04 ASSESSMENT — PAIN SCALES - GENERAL: PAINLEVEL_OUTOF10: EXTREME PAIN (8)

## 2024-11-04 NOTE — PROGRESS NOTES
WORK  COMP  ASSESSMENT/PLAN:        I have reviewed the nursing notes.  I have reviewed the findings, diagnosis, plan and need for follow up with the patient.        1. Laceration of multiple sites of right hand and fingers without complication, initial encounter  - ibuprofen (ADVIL/MOTRIN) tablet 600 mg  x 1 administered in clinic   - TDAP 7+ (ADACEL,BOOSTRIX)  - skin closure adhesive (DERMABOND) pen; Apply 1 each topically once for 1 dose.  Dispense: 1 each; Refill: 0    2. Laceration of left ring finger without foreign body without damage to nail, initial encounter (Primary)  - ibuprofen (ADVIL/MOTRIN) tablet 600 mg  x 1 administered in clinic   - TDAP 7+ (ADACEL,BOOSTRIX)  - REPAIR SUPERFICIAL, WOUND BODY < =2.5CM    1. Keep stitches absolutely dry for first 24 hours, then you may wash and shower as you normally would.   2. Avoid soaking stitches as this can slow down healing and raise your chance of getting an infection.   3. After 24 hours you may remove the dressing and  change daily after washing  4.  Continue to wash wounds at least daily with soapy water  5. Use Tylenol or Ibuprofen for comfort.  6. Watch for signs of infection such as:    increased pain after 24 hours   Increased temperature   Redness or swelling   Yellow or greenish discharge   Foul odor  7.  Tetanus updated today  8. Return to clinic if any of the above signs are present  9.  Return to clinic in 10 days for suture removal  10.  Schedule follow up appointment with Dr. Fontenot, work comp specialist in approximately 10 days       I explained my diagnostic considerations and recommendations to the patient, who voiced understanding and agreement with the treatment plan. All questions were answered. We discussed potential side effects of any prescribed or recommended therapies, as well as expectations for response to treatments.    Glaa Vogt NP  [facility] AND  HOSPITAL      SUBJECTIVE:   Bebeto Prescott is a 49 year old male who presents to clinic today for the following health issues:  Bilateral hand injury     HPI  He works part time for [employer]. 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  Denies numbness, tingling or weakness.  Bleeding controlled.  He was not able to wash the lacerations prior to arrival at clinic so he used a water bottle to rinse what he could.  He covered with gauzed and electrical tape.    Last tetanus 4/3/2017.  He has not taken any OTC medications today.  No anticoagulation.          Past Medical History:   Diagnosis Date    Carpal tunnel syndrome of right wrist     11/17/2016    Radial collateral ligament sprain of elbow     10/20/2016     Past Surgical History:   Procedure Laterality Date    COLONOSCOPY      2006,Normal,  Follow-up recommended at age 49.    OTHER SURGICAL HISTORY      1999,53338.0,[place] EXCIS SPERMATOCELE,excision of spermatocele and hydrocele, [Other]     Social History     Tobacco Use    Smoking status: Never    Smokeless tobacco: Never   Substance Use Topics    Alcohol use: No     Alcohol/week: 0.0 standard drinks of alcohol     Current Outpatient Medications   Medication Sig Dispense Refill    PARoxetine (PAXIL) 10 MG tablet TAKE 1 TABLET BY MOUTH EVERY MORNING 30 tablet 11    triamcinolone (KENALOG) 0.1 % external cream Apply 2 g topically.       Allergies   Allergen Reactions    Mold Other (See Comments)     Moldy leaves: sneezing, runny nose, cough    Molds & Smuts Unknown    Trichophyton Other (See Comments)     Moldy leaves: sneezing, runny nose, cough         Past medical history, past surgical history, current medications and allergies reviewed and accurate to the best of my knowledge.        OBJECTIVE:     BP (!) 152/114  "(BP Location: Left arm, Patient Position: Sitting, Cuff Size: Adult Regular)   Pulse 69   Temp 97.6  F (36.4  C) (Tympanic)   Resp 16   Ht 1.715 m (5' 7.5\")   Wt 94.3 kg (208 lb)   SpO2 97%   BMI 32.10 kg/m    Body mass index is 32.1 kg/m .        Physical Exam  General Appearance: Well appearing adult male, appropriate appearance for age. No acute distress  Cardiac: CMS intact to bilateral upper extremities with brisk capillary refill and strong radial pulses  Musculoskeletal:  Equal movement of bilateral upper extremities.  Normal movement and strength to fingers of bilateral hands.  Equal movement of bilateral lower extremities.  Normal gait.    Dermatological: Right index finger with superficial laceration measuring approximately 1 cm, skin flapped with no visible deep structures damage and no active bleeding - Wound debrided of metal shavings and Dermabond applied.    Right ring finger with large skin avulsion wound with shallow wound without deep structure damage, no remaining skin available for wound closure so area bandaged with telfa, kerlix and coban.  Right hand on ulnar side of palm with large skin tear measuring approximately 4 cm around with flapped skin with no visible deep structures damage and no active bleeding - Area numbed with 2 ml of Lidocaine without Epi to allow loosening of large skin flap and wound debridement of metal shavings followed by Dermabond application and bandaged with telfa, kerlix and coban.   Multiple small surface scratches/scrapes on right palm.  Single blood blister on right palm.  Left ring finger with approximately 1.5 cm laceration on volar side over phalanx with U shaped skin flap with no visible deep structure damage and no active bleeding - area numbed with 2 ml of Lidocaine without Epi followed by placement of sutures - see procedure note.  Psychological: normal affect, alert, oriented, and pleasant.       Procedural note:   Options are discussed and patient " decided to proceed with the suture placement.   Risks and benefits discussed.  Verbal consent obtained.    PROCEDURE:  Laceration repair  LOCATION:  left ring finger, volar side  LACERATION LENGTH:  approximately 1.5 cm   ANESTHESIA:  Local using Lidocaine 1% without Epinephrine, total of 2 ml   PREPARATION:  soaked with warm water and Hibiclens, scrubbed with water/Hibiclens gauze  CONTAMINATION: the wound was not contaminated   FOREIGN BODIES:  small metal shavings present which were debrided out   DEBRIDEMENT:  superficial debridement to remove embedded metal shavings   TENDON INVOLVEMENT: none  CLOSURE:  Wound closed in one layer.  Skin closed with total of 5 sutures using 5.0 Ethilon  TECHNIQUE: interrupted  APPROXIMATION: close  APROXIMATION DIFFICULTY: simple  Bandage:  kerlix and coban   Patient tolerance: Patient tolerated the procedure well with no immediate complications.

## 2024-11-15 ENCOUNTER — TELEPHONE (OUTPATIENT)
Dept: FAMILY MEDICINE | Facility: OTHER | Age: 49
End: 2024-11-15
Payer: COMMERCIAL

## 2024-11-15 NOTE — TELEPHONE ENCOUNTER
The patient requests a call back regarding if it is okay to wait to have his sutures removed on Monday. The patient stated he was told to come back to do so at around 10 days, 10 days is today.    Okay to leave detailed message.      Patrizia Cervantes on 11/15/2024 at 12:44 PM

## 2024-11-18 ENCOUNTER — OFFICE VISIT (OUTPATIENT)
Dept: FAMILY MEDICINE | Facility: OTHER | Age: 49
End: 2024-11-18
Attending: CHIROPRACTOR

## 2024-11-18 ENCOUNTER — ALLIED HEALTH/NURSE VISIT (OUTPATIENT)
Dept: INTERNAL MEDICINE | Facility: OTHER | Age: 49
End: 2024-11-18
Attending: CHIROPRACTOR

## 2024-11-18 VITALS
BODY MASS INDEX: 31.39 KG/M2 | RESPIRATION RATE: 17 BRPM | HEART RATE: 74 BPM | WEIGHT: 200 LBS | SYSTOLIC BLOOD PRESSURE: 134 MMHG | OXYGEN SATURATION: 100 % | HEIGHT: 67 IN | DIASTOLIC BLOOD PRESSURE: 85 MMHG | TEMPERATURE: 97.3 F

## 2024-11-18 DIAGNOSIS — Z48.02 ENCOUNTER FOR REMOVAL OF SUTURES: Primary | ICD-10-CM

## 2024-11-18 DIAGNOSIS — S61.215A LACERATION OF LEFT RING FINGER WITHOUT FOREIGN BODY WITHOUT DAMAGE TO NAIL, INITIAL ENCOUNTER: ICD-10-CM
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
## 2024-11-18 DIAGNOSIS — S61.219A LACERATION OF MULTIPLE SITES OF RIGHT HAND AND FINGERS WITHOUT COMPLICATION, INITIAL ENCOUNTER: Primary | ICD-10-CM

## 2024-11-18 DIAGNOSIS — S61.411A LACERATION OF MULTIPLE SITES OF RIGHT HAND AND FINGERS WITHOUT COMPLICATION, INITIAL ENCOUNTER: Primary | ICD-10-CM

## 2024-11-18 ASSESSMENT — PAIN SCALES - GENERAL: PAINLEVEL_OUTOF10: NO PAIN (0)

## 2024-11-18 NOTE — PROGRESS NOTES
Bebeto Prescott presents to the clinic for removal of suture and sutures,staples, steri strips. The patient has had sutures in place for 14 days. There has been no patient reported signs or symptoms of infection or drainage. 3  sutures and sutures,staples, staple, steri strips are seen and located on the Left ring finger. Tetanus status is up to date. All sutures and sutures,staples, steri strips were easily removed today. Routine wound care discussed by the RN or provider. The patient will follow up as needed.     Gena Elmore RN on 11/18/2024 at 1:44 PM

## 2024-11-18 NOTE — PROGRESS NOTES
"CHIEF COMPLAINT:   Chief Complaint   Patient presents with    Work Comp       HISTORY OF PRESENTING INJURY     Subjective given at ED is accurate and confirmed:    \"He works part time for [employer]. 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  Denies numbness, tingling or weakness.  Bleeding controlled.  He was not able to wash the lacerations prior to arrival at clinic so he used a water bottle to rinse what he could.  He covered with gauzed and electrical tape.    Last tetanus 4/3/2017.  He has not taken any OTC medications today.  No anticoagulation.\"    Here today to remove stiches.  No pain or discharge.            PAST MEDICAL HISTORY:  Past Medical History:   Diagnosis Date    Carpal tunnel syndrome of right wrist     11/17/2016    Radial collateral ligament sprain of elbow     10/20/2016       PAST SURGICAL HISTORY:  Past Surgical History:   Procedure Laterality Date    COLONOSCOPY      2006,Normal,  Follow-up recommended at age 49.    OTHER SURGICAL HISTORY      1999,60668.0,[place] EXCIS SPERMATOCELE,excision of spermatocele and hydrocele, [Other]       ALLERGIES:  Allergies   Allergen Reactions    Mold Other (See Comments)     Moldy leaves: sneezing, runny nose, cough    Molds & Smuts Unknown    Trichophyton Other (See Comments)     Moldy leaves: sneezing, runny nose, cough       CURRENT MEDICATIONS:  Current Outpatient Medications   Medication Sig Dispense Refill    PARoxetine (PAXIL) 10 MG tablet TAKE 1 TABLET BY MOUTH EVERY MORNING 30 tablet 11    triamcinolone (KENALOG) 0.1 % external cream Apply 2 g topically.         SOCIAL HISTORY:  Social History     Socioeconomic History    Marital status:      Spouse name: Not on file    Number of children: Not on file    Years of education: Not on file    Highest " education level: Not on file   Occupational History    Not on file   Tobacco Use    Smoking status: Never    Smokeless tobacco: Never   Vaping Use    Vaping status: Never Used   Substance and Sexual Activity    Alcohol use: No     Alcohol/week: 0.0 standard drinks of alcohol    Drug use: No    Sexual activity: Yes     Partners: Female     Birth control/protection: None   Other Topics Concern    Parent/sibling w/ CABG, MI or angioplasty before 65F 55M? Not Asked   Social History Narrative    Single.  Recently moved to [place] to  a new [place].     Social Drivers of Health     Financial Resource Strain: Low Risk  (1/30/2023)    Received from [facility] [employer] [facility]    Overall Financial Resource Strain (CARDIA)     Difficulty of Paying Living Expenses: Not hard at all   Food Insecurity: No Food Insecurity (1/30/2023)    Received from [facility] [employer] [facility]    Hunger Vital Sign     Worried About Running Out of Food in the Last Year: Never true     Ran Out of Food in the Last Year: Never true   Transportation Needs: No Transportation Needs (1/30/2023)    Received from [facility] [employer] [facility]    PRAPARE - Transportation     Lack of Transportation (Medical): No     Lack of Transportation (Non-Medical): No   Physical Activity: Not on file   Stress: Not on file   Social Connections: Not on file   Interpersonal Safety: Low Risk  (11/18/2024)    Interpersonal Safety     Do you feel physically and emotionally safe where you currently live?: Yes     Within the past 12 months, have you been hit, slapped, kicked or otherwise physically hurt by someone?: No     Within the past 12 months, have you been humiliated or emotionally abused in other ways by your partner or ex-partner?: No   Housing Stability: Not on  "file       FAMILY HISTORY:  Family History   Problem Relation Age of Onset    Hyperlipidemia Father         Hyperlipidemia,Hyperlipidemia    Kidney Cancer Father     Other - See Comments Mother         GI Disease,Diverticulosis.    Breast Cancer Sister         Cancer-breast,Possible breast cancer    Other - See Comments Brother         Psychiatric illness,Depression, learning disability    Colon Cancer No family hx of     Prostate Cancer No family hx of        REVIEW OF SYSTEMS:    Unremarkable other than chief complaint      PHYSICAL EXAM:   /85   Pulse 74   Temp 97.3  F (36.3  C) (Temporal)   Resp 17   Ht 1.702 m (5' 7\")   Wt 90.7 kg (200 lb)   SpO2 100%   BMI 31.32 kg/m   Body mass index is 31.32 kg/m .    General Appearance: No acute distress.    Normal well healing lacerations at Right hypothenar eminence, Right 4th digit, and left dorsal 4th digit.  No purulent discharge.    Demonstrates full  with normal strength testing above 40 lbs each.  Full manual dexterity.      IMPRESSION/PLAN:    He does have sensation loss at the areas, and for this I will hold his chart open to end of December based on complexity of injuries and time to heal.  He is given full release to work at normal capacity.  Return if worsening occurs.  Two copies of new workability issued to him.     Total time spent today in chart review/preparation, face to face evaluation, and documentation: 24 minutes.      Lincoln Fontenot DC, OPAL, FLOYD  Director - Occupational Medicine Department  Diplomate of the American Board of Forensic Professionals  Board Certified - American Board of Independent Medical Examiners      2:04 PM 11/18/2024    "

## 2025-07-19 ENCOUNTER — HEALTH MAINTENANCE LETTER (OUTPATIENT)
Age: 50
End: 2025-07-19

## (undated) RX ORDER — FENTANYL CITRATE 50 UG/ML
INJECTION, SOLUTION INTRAMUSCULAR; INTRAVENOUS
Status: DISPENSED
Start: 2019-06-01

## (undated) RX ORDER — LIDOCAINE HYDROCHLORIDE 10 MG/ML
INJECTION, SOLUTION EPIDURAL; INFILTRATION; INTRACAUDAL; PERINEURAL
Status: DISPENSED
Start: 2024-11-04

## (undated) RX ORDER — SODIUM CHLORIDE 9 MG/ML
INJECTION, SOLUTION INTRAVENOUS
Status: DISPENSED
Start: 2019-06-01

## (undated) RX ORDER — PANTOPRAZOLE SODIUM 40 MG/10ML
INJECTION, POWDER, LYOPHILIZED, FOR SOLUTION INTRAVENOUS
Status: DISPENSED
Start: 2019-06-01

## (undated) RX ORDER — ONDANSETRON 2 MG/ML
INJECTION INTRAMUSCULAR; INTRAVENOUS
Status: DISPENSED
Start: 2019-06-01

## (undated) RX ORDER — CEFAZOLIN SODIUM 2 G/100ML
INJECTION, SOLUTION INTRAVENOUS
Status: DISPENSED
Start: 2020-11-07